# Patient Record
Sex: FEMALE | Race: WHITE | URBAN - METROPOLITAN AREA
[De-identification: names, ages, dates, MRNs, and addresses within clinical notes are randomized per-mention and may not be internally consistent; named-entity substitution may affect disease eponyms.]

---
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
## 2017-08-15 LAB — BLD GP AB SCN SERPL QL: NORMAL

## 2017-08-30 ENCOUNTER — TRANSFERRED RECORDS (OUTPATIENT)
Dept: HEALTH INFORMATION MANAGEMENT | Facility: CLINIC | Age: 22
End: 2017-08-30

## 2017-08-30 LAB
C TRACH DNA SPEC QL PROBE+SIG AMP: NORMAL
N GONORRHOEA DNA SPEC QL PROBE+SIG AMP: NORMAL
SPECIMEN DESCRIP: NORMAL
SPECIMEN DESCRIPTION: NORMAL

## 2017-08-31 LAB
ABO + RH BLD: NORMAL
ABO + RH BLD: NORMAL
HBV SURFACE AG SERPL QL IA: NORMAL
HEMOGLOBIN: 15.1 G/DL (ref 11.7–15.7)
PLATELET # BLD AUTO: 190 10^9/L
RUBELLA ANTIBODY IGG QUANTITATIVE: NORMAL IU/ML
T PALLIDUM IGG SER QL: NON REACTIVE

## 2017-09-15 ENCOUNTER — PRE VISIT (OUTPATIENT)
Dept: MATERNAL FETAL MEDICINE | Facility: CLINIC | Age: 22
End: 2017-09-15

## 2017-10-02 ENCOUNTER — TELEPHONE (OUTPATIENT)
Dept: MATERNAL FETAL MEDICINE | Facility: CLINIC | Age: 22
End: 2017-10-02

## 2017-10-02 NOTE — TELEPHONE ENCOUNTER
[facility] has an appointment scheduled on 9/26 for pt.  Pt was a no show and there was no answer when [facility] tried and called her back that day.    Pt also no showed on 9/20.  It is now outside of the FTS window for pt.   Primary notified. Orders removed.     Zoe CHERRY  , [facility]

## 2017-11-02 ENCOUNTER — PRENATAL OFFICE VISIT (OUTPATIENT)
Dept: OBGYN | Facility: CLINIC | Age: 22
End: 2017-11-02
Payer: COMMERCIAL

## 2017-11-02 VITALS
SYSTOLIC BLOOD PRESSURE: 120 MMHG | WEIGHT: 211.8 LBS | HEIGHT: 64 IN | HEART RATE: 90 BPM | BODY MASS INDEX: 36.16 KG/M2 | DIASTOLIC BLOOD PRESSURE: 66 MMHG

## 2017-11-02 DIAGNOSIS — Z34.82 ENCOUNTER FOR SUPERVISION OF OTHER NORMAL PREGNANCY IN SECOND TRIMESTER: Primary | ICD-10-CM

## 2017-11-02 DIAGNOSIS — Z22.322 MRSA (METHICILLIN RESISTANT STAPH AUREUS) CULTURE POSITIVE: ICD-10-CM

## 2017-11-02 DIAGNOSIS — Z12.4 SCREENING FOR CERVICAL CANCER: ICD-10-CM

## 2017-11-02 PROBLEM — Z34.90 SUPERVISION OF NORMAL PREGNANCY: Status: ACTIVE | Noted: 2017-11-02

## 2017-11-02 PROCEDURE — 99203 OFFICE O/P NEW LOW 30 MIN: CPT | Performed by: ADVANCED PRACTICE MIDWIFE

## 2017-11-02 PROCEDURE — G0145 SCR C/V CYTO,THINLAYER,RESCR: HCPCS | Performed by: ADVANCED PRACTICE MIDWIFE

## 2017-11-02 RX ORDER — PRENATAL VIT/IRON FUM/FOLIC AC 27MG-0.8MG
1 TABLET ORAL DAILY
COMMUNITY
End: 2018-05-17

## 2017-11-02 NOTE — MR AVS SNAPSHOT
After Visit Summary   11/2/2017    Sarthak Barnes    MRN: [id number]           Patient Information     Date Of Birth          1995        Visit Information        Provider Department      11/2/2017 3:00 PM Jeana Whiteside APRN [facility]        Today's Diagnoses     Encounter for supervision of other normal pregnancy in second trimester    -  1    Screening for cervical cancer        MRSA (methicillin resistant staph aureus) culture positive           Follow-ups after your visit        Follow-up notes from your care team     Return in about 4 weeks (around 11/30/2017) for Prenatal Visit.      Your next 10 appointments already scheduled     Nov 13, 2017  2:00 PM CST   US OB > 14 WEEKS COMPLETE SINGLE with OXUS[facility]   [facility]    [address] [address] [phone]           Please bring a list of your medicines (including vitamins, minerals and over-the-counter drugs). Also, tell your doctor about any allergies you may have. Wear comfortable clothes and leave your valuables at home.  If you re less than 20 weeks drink four 8-ounce glasses of fluid an hour before your exam. If you need to empty your bladder before your exam, try to release only a little urine. Then, drink another glass of fluid.  You may have up to two family members in the exam room. If you bring a small child, an adult must be there to care for him or her.  Please call the Imaging Department at your exam site with any questions.            Nov 27, 2017  3:00 PM CST   [facility]    [address]  [facility] [id number] [phone]              Future tests that were ordered for you today     Open Future Orders        Priority Expected Expires Ordered    US OB > 14 Weeks Complete Single  "Routine  2018            Who to contact     If you have questions or need follow up information about today's clinic visit or your schedule please contact [facility] directly at [phone].  Normal or non-critical lab and imaging results will be communicated to you by MyChart, letter or phone within 4 business days after the clinic has received the results. If you do not hear from us within 7 days, please contact the clinic through MyChart or phone. If you have a critical or abnormal lab result, we will notify you by phone as soon as possible.  Submit refill requests through innocutis or call your pharmacy and they will forward the refill request to us. Please allow 3 business days for your refill to be completed.          Additional Information About Your Visit        [facility] Information     innocutis lets you send messages to your doctor, view your test results, renew your prescriptions, schedule appointments and more. To sign up, go to [email or web address] . Click on \"Log in\" on the left side of the screen, which will take you to the Welcome page. Then click on \"Sign up Now\" on the right side of the page.     You will be asked to enter the access code listed below, as well as some personal information. Please follow the directions to create your username and password.     Your access code is: [email or web address]  Expires: 2018  4:34 PM     Your access code will  in 90 days. If you need help or a new code, please call your [facility] clinic or [phone].        Care EveryWhere ID     This is your Care EveryWhere ID. This could be used by other organizations to access your [facility] medical records  [id number]        Your Vitals Were     Pulse Height Last Period BMI (Body Mass Index)          90 5' 4\" (1.626 m) 06/10/2017 36.36 kg/m2         Blood Pressure from Last 3 Encounters:   17 120/66    Weight from Last 3 Encounters:   17 211 lb 12.8 oz (96.1 kg)            "   We Performed the Following     PAP imaged thin layer screen        Primary Care Provider    Physician No Ref-Primary       NO REF-PRIMARY PHYSICIAN        Equal Access to Services     SONIA JUÁREZ : Hadii aad ku hadkimberlyanisa Cano, danniashwin orantes, trinajoão wisejoseashwin garcia, kaveh wasserman. So [facility] [phone].    ATENCIÓN: Si habla español, tiene a martins disposición servicios gratuitos de asistencia lingüística. Llame al [phone].    We comply with applicable federal civil rights laws and [place] laws. We do not discriminate on the basis of race, color, national origin, age, disability, sex, sexual orientation, or gender identity.            Thank you!     Thank you for choosing [facility]  for your care. Our goal is always to provide you with excellent care. Hearing back from our patients is one way we can continue to improve our services. Please take a few minutes to complete the written survey that you may receive in the mail after your visit with us. Thank you!             Your Updated Medication List - Protect others around you: Learn how to safely use, store and throw away your medicines at www.disposemymeds.org.          This list is accurate as of: 11/2/17  4:34 PM.  Always use your most recent med list.                   Brand Name Dispense Instructions for use Diagnosis    prenatal multivitamin plus iron 27-0.8 MG Tabs per tablet      Take 1 tablet by mouth daily        VITAMIN B6 PO      Take 25 mg by mouth

## 2017-11-02 NOTE — LETTER
November 9, 2017      Sarthak Barnes  [address] [address] [facility] [address]    Dear ,      I am happy to inform you that your recent cervical cancer screening test (PAP smear) was normal.      Preventative screenings such as this help to ensure your health for years to come. You should repeat a pap smear in 3 years, unless otherwise directed.      You will still need to return to the clinic every year for your annual exam and other preventive tests.     Please contact the clinic at [phone] if you have further questions.       Sincerely,      SHERON Ni CNM/aniya

## 2017-11-02 NOTE — PROGRESS NOTES
Sarthak Barnes is a 22 year old single ,  who is a previous CNM patient. She presents for a transfer of care OB Visit. This was not a planned pregnancy.     FOB is Seth who is in good health.  FOB IS actively involved in relationship and this pregnancy. FATHER OF BABY was adopted; does not know any of his PMH.     Sarthak is transferring to us from another clinic because she was not satisfied with her care. She [place] prior baby [place]. She had an uncomplicated pregnancy. She also had an early SAB requiring D&C. She has had all of her prenatal labs drawn and they are normal. First trimester U/S confirmed viability; dates changed to reflect U/S. She reports a history of abnormal pap in her prior pregnancy; no follow up. Her records reflect a history of MRSA; Sarthak did not share this today. She will require two tests of cure. She also reports a history of migraines; not affecting this pregnancy. She has significant back pain in the pregnancy; worse when she lies on her back.     She has not had bleeding since her LMP.    She denies abdominal pain since her LMP.  She has had nausea.  has had vomiting. Both resolved.  Any personal or family history of blood clots? No  History of sickle cell anemia or trait? No         Patient's last menstrual period was 06/10/2017..  Estimated Date of Delivery: 2018 Ultrasound consistent with LMP.    MENSTRUAL HISTORY    frequency: every 28 days  Last PAP:  2016  History of abnormal Pap?  Yes: unsure of diagnosis, no follow up    Health maintenance updated:  yes        Current medications are:    Current Outpatient Prescriptions:      Pyridoxine HCl (VITAMIN B6 PO), Take 25 mg by mouth, Disp: , Rfl:      Prenatal Vit-Fe Fumarate-FA (PRENATAL MULTIVITAMIN PLUS IRON) 27-0.8 MG TABS per tablet, Take 1 tablet by mouth daily, Disp: , Rfl:      INFECTION HISTORY  HIV: No  Hepatitis B: No  Hepatitis C: No  Tuberculosis:  declined testing  Genital Herpes self: no  Herpes partner:  no  Chlamydia:  no  Gonorrhea:  no  HPV: unsure, pap collected today  BV:  No  Syphilis:  No  Chicken Pox:  No, she has been vaccinated       OB HISTORY  Obstetric History       T1      L1     SAB0   TAB0   Ectopic0   Multiple0   Live Births1       # Outcome Date GA Lbr Logan/2nd Weight Sex Delivery Anes PTL Lv   3 Current            2 Term 10/18/14 40w0d   M Vag-Spont   JAVAD   1 SAB      SAB             History of GDM: No,  PTL : No,  History of HTN in pregnancy: No,  Thrombocytopenia: No,  Shoulder dystocia: No,  Vacuum Extraction: No  PPH: No   3rd of 4th degree laceration: No.   Other complications: PROM with last pregnancy at 38 weeks, did not report PROM for 2 days, reports being in labor for three days at the hospital once she did go in.     PERSONAL HISTORY  Exercise Habits:  none   Employment: Unemployed.     Travel plans:  are none planned.   Diet: eats regular meals  Abuse concerns? No  Hgb A1c screen:  BMI > 30: Yes, 1st degree family DM: No, History of GDM: No, PCOS: No, High risk ethnicity: No, early 1hr GCT 89    Social History     Social History     Marital status: Single     Spouse name: N/A     Number of children: N/A     Years of education: N/A     Occupational History     Not on file.     Social History Main Topics     Smoking status: Former Smoker     Quit date: 2014     Smokeless tobacco: Never Used     Alcohol use No     Drug use: No     Sexual activity: Yes     Partners: Male     Other Topics Concern     Not on file     Social History Narrative         She  reports that she quit smoking about 3 years ago. She has never used smokeless tobacco.      STD testing offered?  done at previous clinic, negative  Last PHQ-9 score on record = No flowsheet data found.  Last GAD7 score on record = No flowsheet data found.  Alcohol Score = none    PAST MEDICAL/SURGICAL HISTORY  Past Medical History:   Diagnosis Date     Migraine   "    MRSA (methicillin resistant staph aureus) culture positive     was told at a walk in clinic that she has MRSA and was treated, no reoccurance.      Past Surgical History:   Procedure Laterality Date     D & C  2013       FAMILY HISTORY  Family History   Problem Relation Age of Onset     Schizophrenia Mother          ROS:  12 point review of systems negative other than symptoms noted below.      PHYSICAL EXAM  Vitals: /66 (BP Location: Left arm, Patient Position: Sitting, Cuff Size: Adult Large)  Pulse 90  Ht 5' 4\" (1.626 m)  Wt 211 lb 12.8 oz (96.1 kg)  LMP 06/10/2017  BMI 36.36 kg/m2  BMI= Body mass index is 36.36 kg/(m^2).     GENERAL:  22 year old pleasant pregnant female, alert, cooperative and well groomed.  NECK:  Thyroid without enlargement and nodules.  Lymph nodes not palpable.   LUNGS:  Clear to auscultation.  BREAST:  Symmetrical without lesions or nodes.  Nipples everted.  Areolas symmetric.  No palpable axillary nodes.  HEART:  RRR without murmur.  ABDOMEN: Soft without masses or tenderness.  No scars noted..  GENITALIA: BUS without tenderness or inflammation.  Perineum without lesions.    VAGINA:  Pink, normal rugae and discharge.  CERVIX:  Posterior, smooth, without discharge, and firm/ closed   UTERUS: Anteverted and Midposition, nontender 19 weeks in size.  ADNEXA: Without masses or tenderness  RECTAL:  Normal appearance.  Digital exam deferred.  LOWER EXTREMITIES: No edema. No significant varicosities.    ASSESSMENT/PLAN:    IUP at 18w3d    ICD-10-CM    1. Encounter for supervision of other normal pregnancy in second trimester Z34.82 US OB > 14 Weeks Complete Single   2. Screening for cervical cancer Z12.4 PAP imaged thin layer screen   3. MRSA (methicillin resistant staph aureus) culture positive Z22.322         consult for US for AMA patients: NA  Genetic Testing reviewed and discussed, patient declines further testing. She did have CF testing at her prior clinic " that was negative.     COUNSELING    Instructed on use of triage nurse line and contacting the on call CNM after hours in an emergency.     Symptoms of N&V and fatigue usually start to resolve around 12-16 weeks     Reviewed CNM philosophy, call schedule for labor and delivery, and FSH for delivery    1st OB handout given outlining appointment spacing and CNM information    Reviewed exercise and nutrition    Recommend to gain 15 pounds with her pregnancy.    Discussed OTC medications. OB med list given    Encouraged patient to arrange  if needed    Encouraged patient to take PNV's/DHA    Travel precautions discussed, no air travel after 36 weeks and Zika Virus discussed    Will call patient with lab results when available    Does patient desire a RN home visit from the Pending sale to [facility]?  No     F/U to be addressed next visit:  Review pap and U/S results.    Pap collected today.  Oriented to midwifery care; patient requests midwife care.  Anatomy scan ordered.   Will return to the clinic in 4 weeks for her next routine prenatal check.  Will call to be seen sooner if problems arise.    Jeana SÁNCHEZM

## 2017-11-02 NOTE — NURSING NOTE
"Chief Complaint   Patient presents with     Prenatal Care       Initial /66 (BP Location: Left arm, Patient Position: Sitting, Cuff Size: Adult Large)  Pulse 90  Ht 5' 4\" (1.626 m)  Wt 211 lb 12.8 oz (96.1 kg)  LMP 06/10/2017  BMI 36.36 kg/m2 Estimated body mass index is 36.36 kg/(m^2) as calculated from the following:    Height as of this encounter: 5' 4\" (1.626 m).    Weight as of this encounter: 211 lb 12.8 oz (96.1 kg).  Medication Reconciliation: complete     18w3d    + fetal movement  + vomiting, decreasing in 2nd trimester  -edema  + occ dizziness  - headaches    Tisha Grimaldo, CMA                "

## 2017-11-06 LAB
COPATH REPORT: NORMAL
PAP: NORMAL

## 2017-11-13 ENCOUNTER — RADIANT APPOINTMENT (OUTPATIENT)
Dept: ULTRASOUND IMAGING | Facility: CLINIC | Age: 22
End: 2017-11-13
Attending: ADVANCED PRACTICE MIDWIFE
Payer: COMMERCIAL

## 2017-11-13 DIAGNOSIS — Z34.82 ENCOUNTER FOR SUPERVISION OF OTHER NORMAL PREGNANCY IN SECOND TRIMESTER: ICD-10-CM

## 2017-11-13 PROCEDURE — 76805 OB US >/= 14 WKS SNGL FETUS: CPT | Performed by: OBSTETRICS & GYNECOLOGY

## 2017-11-14 ENCOUNTER — TELEPHONE (OUTPATIENT)
Dept: OBGYN | Facility: CLINIC | Age: 22
End: 2017-11-14

## 2017-11-14 DIAGNOSIS — Z34.80 SUPERVISION OF OTHER NORMAL PREGNANCY, ANTEPARTUM: Primary | ICD-10-CM

## 2017-11-14 NOTE — TELEPHONE ENCOUNTER
Attempted to call patient to review anatomy scan. Left message to call clinic. Anatomy scan normal except for non-visualization of some angles of the fetal heart. Recommend repeat U/S in 2-3 weeks. Jeana Whiteside CNM

## 2017-11-17 NOTE — TELEPHONE ENCOUNTER
Called number listed under pts contacts, and name on the vm was josue. Left a generic message stating that I was trying to reach [facility].   JH Null RN

## 2017-11-22 NOTE — TELEPHONE ENCOUNTER
Future order placed.  Note added to appt next week to verify phone #'s and schedule f/u US.  Tamika Clark RN

## 2017-11-24 ENCOUNTER — RADIANT APPOINTMENT (OUTPATIENT)
Dept: ULTRASOUND IMAGING | Facility: CLINIC | Age: 22
End: 2017-11-24
Attending: ADVANCED PRACTICE MIDWIFE
Payer: COMMERCIAL

## 2017-11-24 DIAGNOSIS — Z34.80 SUPERVISION OF OTHER NORMAL PREGNANCY, ANTEPARTUM: ICD-10-CM

## 2017-11-24 PROCEDURE — 76816 OB US FOLLOW-UP PER FETUS: CPT | Performed by: OBSTETRICS & GYNECOLOGY

## 2017-11-24 NOTE — TELEPHONE ENCOUNTER
Patient called back. Told patient Jeana's message as to why repeat US is needed. Patient agreeable. Transferred to scheduling to make appt.     Blanca Rousseau RN

## 2017-11-27 ENCOUNTER — PRENATAL OFFICE VISIT (OUTPATIENT)
Dept: OBGYN | Facility: CLINIC | Age: 22
End: 2017-11-27
Payer: COMMERCIAL

## 2017-11-27 VITALS
DIASTOLIC BLOOD PRESSURE: 60 MMHG | WEIGHT: 219.3 LBS | TEMPERATURE: 98.1 F | SYSTOLIC BLOOD PRESSURE: 100 MMHG | BODY MASS INDEX: 37.64 KG/M2

## 2017-11-27 DIAGNOSIS — Z34.82 ENCOUNTER FOR SUPERVISION OF OTHER NORMAL PREGNANCY IN SECOND TRIMESTER: Primary | ICD-10-CM

## 2017-11-27 PROCEDURE — 99212 OFFICE O/P EST SF 10 MIN: CPT | Performed by: ADVANCED PRACTICE MIDWIFE

## 2017-11-27 NOTE — MR AVS SNAPSHOT
"              After Visit Summary   2017    Sarthak Barnes    MRN: [id number]           Patient Information     Date Of Birth          1995        Visit Information        Provider Department      2017 3:00 PM Jeana Whiteside APRN [facility]        Today's Diagnoses     Encounter for supervision of other normal pregnancy in second trimester    -  1       Follow-ups after your visit        Follow-up notes from your care team     Return in about 4 weeks (around 2017) for Prenatal Visit.      Who to contact     If you have questions or need follow up information about today's clinic visit or your schedule please contact [facility] directly at [phone].  Normal or non-critical lab and imaging results will be communicated to you by MyChart, letter or phone within 4 business days after the clinic has received the results. If you do not hear from us within 7 days, please contact the clinic through [facility] or phone. If you have a critical or abnormal lab result, we will notify you by phone as soon as possible.  Submit refill requests through GoChime or call your pharmacy and they will forward the refill request to us. Please allow 3 business days for your refill to be completed.          Additional Information About Your Visit        MyChart Information     GoChime lets you send messages to your doctor, view your test results, renew your prescriptions, schedule appointments and more. To sign up, go to [email or web address] . Click on \"Log in\" on the left side of the screen, which will take you to the Welcome page. Then click on \"Sign up Now\" on the right side of the page.     You will be asked to enter the access code listed below, as well as some personal information. Please follow the directions to create your username and password.     Your access code is: [email or web address]  Expires: 2018  3:34 PM     Your access code will  in 90 days. " If you need help or a new code, please call your [facility] clinic or [phone].        Care EveryWhere ID     This is your Care EveryWhere ID. This could be used by other organizations to access your [facility] medical records  [id number]        Your Vitals Were     Temperature Last Period BMI (Body Mass Index)             98.1  F (36.7  C) (Oral) 06/10/2017 37.64 kg/m2          Blood Pressure from Last 3 Encounters:   11/27/17 100/60   11/02/17 120/66    Weight from Last 3 Encounters:   11/27/17 219 lb 4.8 oz (99.5 kg)   11/02/17 211 lb 12.8 oz (96.1 kg)              Today, you had the following     No orders found for display       Primary Care Provider Office Phone # Fax #    [facility] [phone]       [address] [place] [address]        Equal Access to Services     SOINA JUÁREZ : Hadii aad ku hadasho Soomaali, waaxda luqadaha, qaybta kaalmada adeegyada, waxay raynein haydelonten светлана lema . So [facility] [phone].    ATENCIÓN: Si habla español, tiene a martins disposición servicios gratuitos de asistencia lingüística. Leighakathryn al [phone].    We comply with applicable federal civil rights laws and [place] laws. We do not discriminate on the basis of race, color, national origin, age, disability, sex, sexual orientation, or gender identity.            Thank you!     Thank you for choosing [facility]  for your care. Our goal is always to provide you with excellent care. Hearing back from our patients is one way we can continue to improve our services. Please take a few minutes to complete the written survey that you may receive in the mail after your visit with us. Thank you!             Your Updated Medication List - Protect others around you: Learn how to safely use, store and throw away your medicines at www.disposemymeds.org.          This list is accurate as of: 11/27/17  3:31 PM.  Always use your most recent med list.                   Brand  Name Dispense Instructions for use Diagnosis    prenatal multivitamin plus iron 27-0.8 MG Tabs per tablet      Take 1 tablet by mouth daily        VITAMIN B6 PO      Take 25 mg by mouth

## 2017-11-27 NOTE — NURSING NOTE
"Chief Complaint   Patient presents with     Prenatal Care     Discuss F/U U/S       Initial /60 (BP Location: Left arm, Patient Position: Chair, Cuff Size: Adult Large)  Temp 98.1  F (36.7  C) (Oral)  Wt 219 lb 4.8 oz (99.5 kg)  LMP 06/10/2017  BMI 37.64 kg/m2 Estimated body mass index is 37.64 kg/(m^2) as calculated from the following:    Height as of 11/2/17: 5' 4\" (1.626 m).    Weight as of this encounter: 219 lb 4.8 oz (99.5 kg).  Medication Reconciliation: complete  22w0d    "

## 2017-11-27 NOTE — PROGRESS NOTES
S: Feels well,  Has started feeling fetal movement.  Denies uterine cramping, vaginal bleeding or leaking of fluid. Patient had repeat anatomy scan for suboptimal heart views; follow up WNL.   O: Vitals: /60 (BP Location: Left arm, Patient Position: Chair, Cuff Size: Adult Large)  Temp 98.1  F (36.7  C) (Oral)  Wt 219 lb 4.8 oz (99.5 kg)  LMP 06/10/2017  BMI 37.64 kg/m2  BMI= Body mass index is 37.64 kg/(m^2).  Exam:  Constitutional: healthy, alert and no distress  Respiratory: respirations even and unlabored  Gastrointestinal: Abdomen soft, non-tender. Fundus measures appropriate for gestational age. Fetal heart tones hear without difficulty and within normal limits  : Deferred  Psychiatric: mentation appears normal and affect normal/bright  A:    Diagnosis Comments   1. Encounter for supervision of other normal pregnancy in second trimester       P: Discussed 20 week fetal screen.   Encouraged increase in oral hydration.  Encouraged patient to call with any questions or concerns.    Jeana Whiteside CNM

## 2017-12-22 ENCOUNTER — PRENATAL OFFICE VISIT (OUTPATIENT)
Dept: OBGYN | Facility: CLINIC | Age: 22
End: 2017-12-22
Payer: MEDICAID

## 2017-12-22 VITALS
DIASTOLIC BLOOD PRESSURE: 60 MMHG | SYSTOLIC BLOOD PRESSURE: 122 MMHG | BODY MASS INDEX: 38.46 KG/M2 | HEIGHT: 64 IN | HEART RATE: 104 BPM | WEIGHT: 225.3 LBS

## 2017-12-22 DIAGNOSIS — Z34.82 ENCOUNTER FOR SUPERVISION OF OTHER NORMAL PREGNANCY IN SECOND TRIMESTER: Primary | ICD-10-CM

## 2017-12-22 PROCEDURE — 87086 URINE CULTURE/COLONY COUNT: CPT | Performed by: ADVANCED PRACTICE MIDWIFE

## 2017-12-22 PROCEDURE — 99207 ZZC PRENATAL VISIT: CPT | Performed by: ADVANCED PRACTICE MIDWIFE

## 2017-12-22 NOTE — PROGRESS NOTES
"S: Feels well,  Baby active.  Denies uterine cramping, vaginal bleeding or leaking of fluid. Patient has a head cold, got sick from her son. She also reports low back pain. Patient went to walk in clinic for UTI symptoms, no UTI diagnosed but still has symptoms.   O: Vitals: /60  Pulse 104  Ht 5' 4\" (1.626 m)  Wt 225 lb 4.8 oz (102.2 kg)  LMP 06/10/2017  BMI 38.67 kg/m2  BMI= Body mass index is 38.67 kg/(m^2).  Exam:  Constitutional: healthy, alert and no distress  Respiratory: respirations even and unlabored  Gastrointestinal: Abdomen soft, non-tender. Fundus measures appropriate for gestational age. Fetal heart tones hear without difficulty and within normal limits  : Deferred  Psychiatric: mentation appears normal and affect normal/bright  A:    Diagnosis Comments   1. Encounter for supervision of other normal pregnancy in second trimester  Urine Culture Aerobic Bacterial      P: Discussed GCT/repeat RPR for next visit, handout provided, reminded of longer appointment.  Tdap next visit; reviewed CDC recommendations and partner/family vaccination recommended as well.  Need for Rhogam? No  Urine culture.  Discussed using a belly band for back pain.  Discussed comfort measures for head cold.   Encouraged patient to call with any questions or concerns.  Return to clinic 4 weeks    Jeana Whiteside CNM      "

## 2017-12-22 NOTE — NURSING NOTE
"Chief Complaint   Patient presents with     Prenatal Care     Seen in  last week for possible UTI, positive or protein and bilirubin, symptoms still persist.    Initial /60  Pulse 104  Ht 5' 4\" (1.626 m)  Wt 225 lb 4.8 oz (102.2 kg)  LMP 06/10/2017  BMI 38.67 kg/m2 Estimated body mass index is 38.67 kg/(m^2) as calculated from the following:    Height as of this encounter: 5' 4\" (1.626 m).    Weight as of this encounter: 225 lb 4.8 oz (102.2 kg).  Medication Reconciliation: complete     + fetal movement  + nausea  - headache  - edema    Tisha Grimaldo, CORIE    "

## 2017-12-22 NOTE — MR AVS SNAPSHOT
"              After Visit Summary   2017    Sarthak Barnes    MRN: [id number]           Patient Information     Date Of Birth          1995        Visit Information        Provider Department      2017 3:15 PM Jeana Whiteside APRN CNM [facility]        Today's Diagnoses     Encounter for supervision of other normal pregnancy in second trimester    -  1       Follow-ups after your visit        Follow-up notes from your care team     Return in about 4 weeks (around 2018) for Prenatal Visit.      Who to contact     If you have questions or need follow up information about today's clinic visit or your schedule please contact [facility] directly at [phone].  Normal or non-critical lab and imaging results will be communicated to you by MyChart, letter or phone within 4 business days after the clinic has received the results. If you do not hear from us within 7 days, please contact the clinic through [employer] or phone. If you have a critical or abnormal lab result, we will notify you by phone as soon as possible.  Submit refill requests through [employer] or call your pharmacy and they will forward the refill request to us. Please allow 3 business days for your refill to be completed.          Additional Information About Your Visit        MyChart Information     [employer] lets you send messages to your doctor, view your test results, renew your prescriptions, schedule appointments and more. To sign up, go to [email or web address] . Click on \"Log in\" on the left side of the screen, which will take you to the Welcome page. Then click on \"Sign up Now\" on the right side of the page.     You will be asked to enter the access code listed below, as well as some personal information. Please follow the directions to create your username and password.     Your access code is: [email or web address]  Expires: 2018  3:34 PM     Your access code will  in 90 days. " "If you need help or a new code, please call your [place] clinic or [phone].        Care EveryWhere ID     This is your Care EveryWhere ID. This could be used by other organizations to access your [place] medical records  [id number]        Your Vitals Were     Pulse Height Last Period BMI (Body Mass Index)          104 5' 4\" (1.626 m) 06/10/2017 38.67 kg/m2         Blood Pressure from Last 3 Encounters:   12/22/17 122/60   11/27/17 100/60   11/02/17 120/66    Weight from Last 3 Encounters:   12/22/17 225 lb 4.8 oz (102.2 kg)   11/27/17 219 lb 4.8 oz (99.5 kg)   11/02/17 211 lb 12.8 oz (96.1 kg)              We Performed the Following     Urine Culture Aerobic Bacterial        Primary Care Provider Office Phone # Fax #    [facility] [phone]       [address] [place] [address]        Equal Access to Services     SONIA JUÁREZ AH: Hadii aad ku hadasho Soomaali, waaxda luqadaha, qaybta kaalmada adeegyada, waxay idiin haydelonten светлана lema . So [facility] [phone].    ATENCIÓN: Si habla español, tiene a martins disposición servicios gratuitos de asistencia lingüística. Llame al [phone].    We comply with applicable federal civil rights laws and [place] laws. We do not discriminate on the basis of race, color, national origin, age, disability, sex, sexual orientation, or gender identity.            Thank you!     Thank you for choosing [facility]  for your care. Our goal is always to provide you with excellent care. Hearing back from our patients is one way we can continue to improve our services. Please take a few minutes to complete the written survey that you may receive in the mail after your visit with us. Thank you!             Your Updated Medication List - Protect others around you: Learn how to safely use, store and throw away your medicines at www.disposemymeds.org.          This list is accurate as of: 12/22/17  3:42 PM.  Always use " your most recent med list.                   Brand Name Dispense Instructions for use Diagnosis    prenatal multivitamin plus iron 27-0.8 MG Tabs per tablet      Take 1 tablet by mouth daily        VITAMIN B6 PO      Take 25 mg by mouth

## 2017-12-23 LAB
BACTERIA SPEC CULT: NORMAL
SPECIMEN SOURCE: NORMAL

## 2018-01-15 ENCOUNTER — PRENATAL OFFICE VISIT (OUTPATIENT)
Dept: OBGYN | Facility: CLINIC | Age: 23
End: 2018-01-15
Payer: MEDICAID

## 2018-01-15 VITALS — WEIGHT: 229.3 LBS | DIASTOLIC BLOOD PRESSURE: 70 MMHG | SYSTOLIC BLOOD PRESSURE: 122 MMHG | BODY MASS INDEX: 39.36 KG/M2

## 2018-01-15 DIAGNOSIS — Z34.81 ENCOUNTER FOR SUPERVISION OF OTHER NORMAL PREGNANCY IN FIRST TRIMESTER: Primary | ICD-10-CM

## 2018-01-15 LAB
ERYTHROCYTE [DISTWIDTH] IN BLOOD BY AUTOMATED COUNT: 13 % (ref 10–15)
HCT VFR BLD AUTO: 35.3 % (ref 35–47)
HGB BLD-MCNC: 11.8 G/DL (ref 11.7–15.7)
MCH RBC QN AUTO: 29.4 PG (ref 26.5–33)
MCHC RBC AUTO-ENTMCNC: 33.4 G/DL (ref 31.5–36.5)
MCV RBC AUTO: 88 FL (ref 78–100)
PLATELET # BLD AUTO: 154 10E9/L (ref 150–450)
RBC # BLD AUTO: 4.01 10E12/L (ref 3.8–5.2)
WBC # BLD AUTO: 8.3 10E9/L (ref 4–11)

## 2018-01-15 PROCEDURE — 36415 COLL VENOUS BLD VENIPUNCTURE: CPT | Performed by: ADVANCED PRACTICE MIDWIFE

## 2018-01-15 PROCEDURE — 85027 COMPLETE CBC AUTOMATED: CPT | Performed by: ADVANCED PRACTICE MIDWIFE

## 2018-01-15 PROCEDURE — 99207 ZZC PRENATAL VISIT: CPT | Performed by: ADVANCED PRACTICE MIDWIFE

## 2018-01-15 PROCEDURE — 86780 TREPONEMA PALLIDUM: CPT | Performed by: ADVANCED PRACTICE MIDWIFE

## 2018-01-15 PROCEDURE — 90715 TDAP VACCINE 7 YRS/> IM: CPT | Performed by: ADVANCED PRACTICE MIDWIFE

## 2018-01-15 PROCEDURE — 90471 IMMUNIZATION ADMIN: CPT | Performed by: ADVANCED PRACTICE MIDWIFE

## 2018-01-15 PROCEDURE — 82950 GLUCOSE TEST: CPT | Performed by: ADVANCED PRACTICE MIDWIFE

## 2018-01-15 NOTE — MR AVS SNAPSHOT
"              After Visit Summary   1/15/2018    Sarthak Barnes    MRN: [id number]           Patient Information     Date Of Birth          1995        Visit Information        Provider Department      1/15/2018 3:15 PM Jeana Whiteside APRN CNM [facility]        Today's Diagnoses     Encounter for supervision of other normal pregnancy in first trimester    -  1       Follow-ups after your visit        Follow-up notes from your care team     Return in about 2 weeks (around 2018) for Prenatal Visit.      Who to contact     If you have questions or need follow up information about today's clinic visit or your schedule please contact [facility] directly at [phone].  Normal or non-critical lab and imaging results will be communicated to you by MyChart, letter or phone within 4 business days after the clinic has received the results. If you do not hear from us within 7 days, please contact the clinic through [employer] or phone. If you have a critical or abnormal lab result, we will notify you by phone as soon as possible.  Submit refill requests through [employer] or call your pharmacy and they will forward the refill request to us. Please allow 3 business days for your refill to be completed.          Additional Information About Your Visit        MyChart Information     [employer] lets you send messages to your doctor, view your test results, renew your prescriptions, schedule appointments and more. To sign up, go to [email or web address] . Click on \"Log in\" on the left side of the screen, which will take you to the Welcome page. Then click on \"Sign up Now\" on the right side of the page.     You will be asked to enter the access code listed below, as well as some personal information. Please follow the directions to create your username and password.     Your access code is: [email or web address]  Expires: 2018  3:34 PM     Your access code will  in 90 days. If " you need help or a new code, please call your [place] clinic or [phone].        Care EveryWhere ID     This is your Care EveryWhere ID. This could be used by other organizations to access your [place] medical records  [id number]        Your Vitals Were     Last Period BMI (Body Mass Index)                06/10/2017 39.36 kg/m2           Blood Pressure from Last 3 Encounters:   01/15/18 122/70   12/22/17 122/60   11/27/17 100/60    Weight from Last 3 Encounters:   01/15/18 229 lb 4.8 oz (104 kg)   12/22/17 225 lb 4.8 oz (102.2 kg)   11/27/17 219 lb 4.8 oz (99.5 kg)              We Performed the Following     Anti Treponema     CBC with platelets     Glucose tolerance gest screen 1 hour        Primary Care Provider Office Phone # Fax #    [facility] [phone]       [address] [place] [address]        Equal Access to Services     SONIA JUÁREZ : Hadii aad ku hadasho Soomaali, waaxda luqadaha, qaybta kaalmada adeegyada, waxay idiin hayaan cadyeg vitaarasayra lema . So [facility] [phone].    ATENCIÓN: Si habla español, tiene a martins disposición servicios gratuitos de asistencia lingüística. Llame al [phone].    We comply with applicable federal civil rights laws and [place] laws. We do not discriminate on the basis of race, color, national origin, age, disability, sex, sexual orientation, or gender identity.            Thank you!     Thank you for choosing [facility]  for your care. Our goal is always to provide you with excellent care. Hearing back from our patients is one way we can continue to improve our services. Please take a few minutes to complete the written survey that you may receive in the mail after your visit with us. Thank you!             Your Updated Medication List - Protect others around you: Learn how to safely use, store and throw away your medicines at www.disposemymeds.org.          This list is accurate as of: 1/15/18  3:58  PM.  Always use your most recent med list.                   Brand Name Dispense Instructions for use Diagnosis    prenatal multivitamin plus iron 27-0.8 MG Tabs per tablet      Take 1 tablet by mouth daily        VITAMIN B6 PO      Take 25 mg by mouth

## 2018-01-15 NOTE — PROGRESS NOTES
S: Feels well,  Baby active.  Denies uterine cramping, vaginal bleeding or leaking of fluid. Discussed comfort measures for hemorrhoids.   O: Vitals: /70 (BP Location: Left arm, Patient Position: Chair, Cuff Size: Adult Regular)  Wt 229 lb 4.8 oz (104 kg)  LMP 06/10/2017  BMI 39.36 kg/m2  BMI= Body mass index is 39.36 kg/(m^2).  Exam:  Constitutional: healthy, alert and no distress  Respiratory: respirations even and unlabored  Gastrointestinal: Abdomen soft, non-tender. Fundus measures appropriate for gestational age. Fetal heart tones hear without difficulty and within normal limits  : Deferred  Psychiatric: mentation appears normal and affect normal/bright  A:     ICD-10-CM    1. Encounter for supervision of other normal pregnancy in first trimester Z34.81 CBC with platelets     Anti Treponema     Glucose tolerance gest screen 1 hour     P: GCT/repeat RPR today, handout provided.  Tdap given; reviewed CDC recommendations and partner/family vaccination recommended as well.  Need for Rhogam? B+.   Encouraged patient to call with any questions or concerns.  Return to clinic 2 weeks    Jeana Whiteside CNM

## 2018-01-15 NOTE — NURSING NOTE
"Chief Complaint   Patient presents with     Prenatal Care       Initial /70 (BP Location: Left arm, Patient Position: Chair, Cuff Size: Adult Regular)  Wt 229 lb 4.8 oz (104 kg)  LMP 06/10/2017  BMI 39.36 kg/m2 Estimated body mass index is 39.36 kg/(m^2) as calculated from the following:    Height as of 12/22/17: 5' 4\" (1.626 m).    Weight as of this encounter: 229 lb 4.8 oz (104 kg).  Medication Reconciliation: complete  29w0d    "

## 2018-01-16 LAB
GLUCOSE 1H P 50 G GLC PO SERPL-MCNC: 100 MG/DL (ref 60–129)
T PALLIDUM IGG+IGM SER QL: NEGATIVE

## 2018-02-07 ENCOUNTER — TELEPHONE (OUTPATIENT)
Dept: OBGYN | Facility: CLINIC | Age: 23
End: 2018-02-07

## 2018-02-07 NOTE — TELEPHONE ENCOUNTER
I called patient to get her rescheduled from a miss appointment with Graciela Perkins CNM on 02/07/2018. She states that with her schedule she is unable to come in sooner than the 22nd of February. She was made an appointment with Magi Nolasco CNM at 1:00 on 2/22/2018.    Espinoza Perkins CMA

## 2018-02-09 ENCOUNTER — TELEPHONE (OUTPATIENT)
Dept: OBGYN | Facility: CLINIC | Age: 23
End: 2018-02-09

## 2018-02-09 ENCOUNTER — HOSPITAL ENCOUNTER (OUTPATIENT)
Facility: CLINIC | Age: 23
Discharge: HOME | End: 2018-02-09
Attending: ADVANCED PRACTICE MIDWIFE | Admitting: ADVANCED PRACTICE MIDWIFE
Payer: MEDICAID

## 2018-02-09 VITALS
SYSTOLIC BLOOD PRESSURE: 125 MMHG | HEART RATE: 96 BPM | RESPIRATION RATE: 18 BRPM | TEMPERATURE: 98 F | DIASTOLIC BLOOD PRESSURE: 80 MMHG

## 2018-02-09 PROBLEM — Z36.89 ENCOUNTER FOR TRIAGE IN PREGNANT PATIENT: Status: ACTIVE | Noted: 2018-02-09

## 2018-02-09 LAB
A1 MICROGLOB PLACENTAL VAG QL: NEGATIVE
ALBUMIN UR-MCNC: 30 MG/DL
APPEARANCE UR: ABNORMAL
BILIRUB UR QL STRIP: NEGATIVE
COLOR UR AUTO: YELLOW
FIBRONECTIN FETAL VAG QL: NEGATIVE
GLUCOSE UR STRIP-MCNC: NEGATIVE MG/DL
HGB UR QL STRIP: NEGATIVE
HYALINE CASTS #/AREA URNS LPF: 1 /LPF (ref 0–2)
KETONES UR STRIP-MCNC: 5 MG/DL
LEUKOCYTE ESTERASE UR QL STRIP: NEGATIVE
MRSA DNA SPEC QL NAA+PROBE: NEGATIVE
MUCOUS THREADS #/AREA URNS LPF: PRESENT /LPF
NITRATE UR QL: NEGATIVE
PH UR STRIP: 9 PH (ref 5–7)
RBC #/AREA URNS AUTO: <1 /HPF (ref 0–2)
SOURCE: ABNORMAL
SP GR UR STRIP: 1.02 (ref 1–1.03)
SPECIMEN SOURCE: ABNORMAL
SPECIMEN SOURCE: NORMAL
SQUAMOUS #/AREA URNS AUTO: 2 /HPF (ref 0–1)
UROBILINOGEN UR STRIP-MCNC: 0 MG/DL (ref 0–2)
WBC #/AREA URNS AUTO: <1 /HPF (ref 0–2)
WET PREP SPEC: ABNORMAL

## 2018-02-09 PROCEDURE — 25000128 H RX IP 250 OP 636: Performed by: ADVANCED PRACTICE MIDWIFE

## 2018-02-09 PROCEDURE — 84112 EVAL AMNIOTIC FLUID PROTEIN: CPT | Performed by: ADVANCED PRACTICE MIDWIFE

## 2018-02-09 PROCEDURE — 96374 THER/PROPH/DIAG INJ IV PUSH: CPT

## 2018-02-09 PROCEDURE — 87210 SMEAR WET MOUNT SALINE/INK: CPT | Performed by: ADVANCED PRACTICE MIDWIFE

## 2018-02-09 PROCEDURE — 87086 URINE CULTURE/COLONY COUNT: CPT | Performed by: ADVANCED PRACTICE MIDWIFE

## 2018-02-09 PROCEDURE — 40000809 ZZH STATISTIC NO DOCUMENTATION TO SUPPORT CHARGE

## 2018-02-09 PROCEDURE — 96360 HYDRATION IV INFUSION INIT: CPT

## 2018-02-09 PROCEDURE — 59025 FETAL NON-STRESS TEST: CPT

## 2018-02-09 PROCEDURE — G0463 HOSPITAL OUTPT CLINIC VISIT: HCPCS | Mod: 25

## 2018-02-09 PROCEDURE — 81001 URINALYSIS AUTO W/SCOPE: CPT | Performed by: ADVANCED PRACTICE MIDWIFE

## 2018-02-09 PROCEDURE — 96361 HYDRATE IV INFUSION ADD-ON: CPT

## 2018-02-09 PROCEDURE — 59025 FETAL NON-STRESS TEST: CPT | Mod: 26 | Performed by: OBSTETRICS & GYNECOLOGY

## 2018-02-09 PROCEDURE — 82731 ASSAY OF FETAL FIBRONECTIN: CPT | Performed by: ADVANCED PRACTICE MIDWIFE

## 2018-02-09 PROCEDURE — 87641 MR-STAPH DNA AMP PROBE: CPT | Performed by: ADVANCED PRACTICE MIDWIFE

## 2018-02-09 PROCEDURE — 87640 STAPH A DNA AMP PROBE: CPT | Performed by: ADVANCED PRACTICE MIDWIFE

## 2018-02-09 RX ORDER — SODIUM CHLORIDE, SODIUM LACTATE, POTASSIUM CHLORIDE, CALCIUM CHLORIDE 600; 310; 30; 20 MG/100ML; MG/100ML; MG/100ML; MG/100ML
INJECTION, SOLUTION INTRAVENOUS CONTINUOUS
Status: DISCONTINUED | OUTPATIENT
Start: 2018-02-09 | End: 2018-02-09 | Stop reason: HOSPADM

## 2018-02-09 RX ORDER — ONDANSETRON 2 MG/ML
4 INJECTION INTRAMUSCULAR; INTRAVENOUS EVERY 6 HOURS PRN
Status: DISCONTINUED | OUTPATIENT
Start: 2018-02-09 | End: 2018-02-09 | Stop reason: HOSPADM

## 2018-02-09 RX ADMIN — SODIUM CHLORIDE, POTASSIUM CHLORIDE, SODIUM LACTATE AND CALCIUM CHLORIDE 500 ML: 600; 310; 30; 20 INJECTION, SOLUTION INTRAVENOUS at 11:08

## 2018-02-09 RX ADMIN — ONDANSETRON 4 MG: 2 INJECTION INTRAMUSCULAR; INTRAVENOUS at 11:08

## 2018-02-09 NOTE — PROGRESS NOTES
MATERNAL ASSESSMENT CENTER CNM TRIAGE NOTE    Sarthak Barnes is a 23 year old  with and IUP at 32w4d who presents with complaint of contractions and possible ROM.    Patient states baby is active.  Denies vaginal bleeding  Present OB History at [facility] with the CNMs.     Problems this pregnancy:   1. None    ROS:  Patient is alert and oriented      PHYSICAL EXAM:  /80  Pulse 96  Temp 98  F (36.7  C)  Resp 18  LMP 06/10/2017    FHT's 140s with moderate variability  Accelerations: present   Decelerations:  absent        Contractions: Pt is not tobi     Abdomen: gravid, and nontender  Bloody show: no  Cervix: closed/ 0/ Posterior/ firm/ FLOATING  Membranes are intact       ASSESSMENT :   23 year old  with ferrari IUP 32w4d not in labor  NST  reactive  GBS unknown and membranes intact         PLAN:  Discharge home  Continue routine prenatal care      Teaching done r/t to s/s of labor, SROM, decreased fetal movement, comfort measures in third trimester.  Instructed to please refer to the discharge handouts, the RN triage line or on-call CNM for any questions or concerns.  Pt verbalizes understanding and agreement with current plan of care.    GARY FINN CNM

## 2018-02-09 NOTE — PLAN OF CARE
Data: Patient assessed in the [facility] for uterine contractions, leaking vaginal fluid, abdominal pain, pelvic pain, nausea and vomiting.  Cervical exam closed.  Membranes intact.   Action:  Presumed adequate fetal oxygenation documented (see flow record). Discharge instructions reviewed.  Patient instructed to report change in fetal movement, vaginal leaking of fluid or bleeding, abdominal pain, or any concerns related to the pregnancy to her nurse/physician.    Response: Orders to discharge home per Graciela Perkins.  Patient verbalized understanding of education and verbalized agreement with plan. Discharged to home at 1300 with follow up next week.

## 2018-02-09 NOTE — IP AVS SNAPSHOT
MRN:[id number]                      After Visit Summary   2/9/2018    Sarthak Barnes    MRN: [id number]           Thank you!     Thank you for choosing [facility] for your care. Our goal is always to provide you with excellent care. Hearing back from our patients is one way we can continue to improve our services. Please take a few minutes to complete the written survey that you may receive in the mail after you visit. If you would like to speak to someone directly about your visit please contact Patient Relations at [phone]. Thank you!          Patient Information     Date Of Birth          1995        About your hospital stay     You were admitted on:  February 9, 2018 You last received care in the:  [facility] Labor and Delivery    You were discharged on:  February 9, 2018       Who to Call     For medical emergencies, please call 911.  For non-urgent questions about your medical care, please call your primary care provider or clinic, [phone]          Attending Provider     Provider Graciela Nick CNM OB/Gyn       Primary Care Provider Office Phone # Fax #    [facility] [phone]      Your next 10 appointments already scheduled     Feb 22, 2018  1:00 PM CST   ESTABLISHED PRENATAL with SHERON Harrington CNM   [facility] ([facility])    [address]  [facility] [address] [phone]              Further instructions from your care team       Discharge Instruction for Undelivered Patients      You were seen for: Labor Assessment  We Consulted: Graciela Perkins CNM  You had (Test or Medicine):IV fluids, monitoring, labs tests     Diet:   Drink 8 to 12 glasses of liquids (milk, juice, water) every day.  You may eat meals and snacks.     Activity:  Call your doctor or nurse midwife if your baby is moving less than usual.     Call your provider if you  "notice:  Swelling in your face or increased swelling in your hands or legs.  Headaches that are not relieved by Tylenol (acetaminophen).  Changes in your vision (blurring: seeing spots or stars.)  Nausea (sick to your stomach) and vomiting (throwing up).   Weight gain of 5 pounds or more per week.  Heartburn that doesn't go away.  Signs of bladder infection: pain when you urinate (use the toilet), need to go more often and more urgently.  The bag of gipson (rupture of membranes) breaks, or you notice leaking in your underwear.  Bright red blood in your underwear.  Abdominal (lower belly) or stomach pain.  Second (plus) baby: Contractions (tightening) less than 10 minutes apart and getting stronger.  *If less than 34 weeks: Contractions (tightenings) more than 6 times in one hour.  Increase or change in vaginal discharge (note the color and amount)      Follow-up:  Make an appointment to be seen on next week.          Pending Results     Date and Time Order Name Status Description    2/9/2018 1205 Urine Culture Aerobic Bacterial In process     2/9/2018 1119 Methicillin Resist/Sens S. aureus PCR In process             Admission Information     Date & Time Provider Department Dept. Phone    2/9/2018 Graciela Perkins, [facility] Labor and Delivery [phone]      Your Vitals Were     Blood Pressure Pulse Temperature Respirations Last Period       125/80 96 98  F (36.7  C) 18 06/10/2017       [employer] Information     [employer] lets you send messages to your doctor, view your test results, renew your prescriptions, schedule appointments and more. To sign up, go to [email or web address] . Click on \"Log in\" on the left side of the screen, which will take you to the Welcome page. Then click on \"Sign up Now\" on the right side of the page.     You will be asked to enter the access code listed below, as well as some personal information. Please follow the directions to create your username and password.     Your " access code is: [email or web address]  Expires: 5/10/2018 12:14 PM     Your access code will  in 90 days. If you need help or a new code, please call your [place] clinic or [phone].        Care EveryWhere ID     This is your Care EveryWhere ID. This could be used by other organizations to access your [place] medical records  [id number]        Equal Access to Services     [facility] MARQUITA : Hadii aad ku hadasho Soomaali, waaxda luqadaha, qaybta kaalmada adeegyada, waxay idiin hayaan adeeg khdebbiesh lazarina . So [facility] [phone].    ATENCIÓN: Si habla espmay, tiene a martins disposición servicios gratuitos de asistencia lingüística. Llame al [phone].    We comply with applicable federal civil rights laws and [place] laws. We do not discriminate on the basis of race, color, national origin, age, disability, sex, sexual orientation, or gender identity.               Review of your medicines      UNREVIEWED medicines. Ask your doctor about these medicines        Dose / Directions    prenatal multivitamin plus iron 27-0.8 MG Tabs per tablet        Dose:  1 tablet   Take 1 tablet by mouth daily   Refills:  0                Protect others around you: Learn how to safely use, store and throw away your medicines at www.disposemymeds.org.             Medication List: This is a list of all your medications and when to take them. Check marks below indicate your daily home schedule. Keep this list as a reference.      Medications           Morning Afternoon Evening Bedtime As Needed    prenatal multivitamin plus iron 27-0.8 MG Tabs per tablet   Take 1 tablet by mouth daily

## 2018-02-09 NOTE — PROVIDER NOTIFICATION
02/09/18 1256   Provider Notification   Provider Name/Title Graciela Perkins   Method of Notification Electronic Page;Phone   Request Evaluate - Remote   Notification Reason Lab/Diagnostic Study   Reviewed wet prep results. Order to discharge pt with follow up next week.

## 2018-02-09 NOTE — H&P
MATERNAL ASSESSMENT CENTER CNM TRIAGE NOTE    Sarthak Barnes is a 23 year old  with and IUP at 32w4d who presents with complaint of pelvic cramping, and possible rupture of membranes. Called the triage nurse earlier and reported contractions every 5 minutes and possible rupture of membranes. Reports that she had two episode of trickling fluids yesterday after a bath.    Patient states baby is active.    Denies vaginal bleeding  Present OB History at [facility] with the CNMs.     Problems this pregnancy:   1. none    ROS:  Patient is alert and oriented  Negative for vision changes, headache  Negative for constipation, diarrhea, vomiting.  Positive for suprapubic pain, nausea  Negative for edema, Reflexed WNL and equal bilaterally    PHYSICAL EXAM:  /80  Pulse 96  Temp 98  F (36.7  C)  Resp 18  LMP 06/10/2017    FHT's 130 with moderate variability  Accelerations: present   Decelerations:  absent        Contractions: Pt is not tobi     Abdomen: gravid, and nontender to palpation  Bloody show: no  Amnisure collected  Sterile speculum inserted, cervix appears closed, swab collected for FFN  Cervix: not checked until results of amnisure available      ASSESSMENT :   23 year old  with ferrari IUP 32w4d for observation.  Pelvic pain and rule out ROM  NST  reactive  GBS unknown          PLAN:  Will continue to monitor and assess further when labs available.        GARY FINN CNM

## 2018-02-09 NOTE — IP AVS SNAPSHOT
[facility] Labor and Delivery    [address]    [facility] [address]    Phone:  [phone]    Fax:  [phone]                                       After Visit Summary   2/9/2018    Sarthak Barnes    MRN: [id number]           After Visit Summary Signature Page     I have received my discharge instructions, and my questions have been answered. I have discussed any challenges I see with this plan with the nurse or doctor.    ..........................................................................................................................................  Patient/Patient Representative Signature      ..........................................................................................................................................  Patient Representative Print Name and Relationship to Patient    ..................................................               ................................................  Date                                            Time    ..........................................................................................................................................  Reviewed by Signature/Title    ...................................................              ..............................................  Date                                                            Time

## 2018-02-09 NOTE — DISCHARGE INSTRUCTIONS
Discharge Instruction for Undelivered Patients      You were seen for: Labor Assessment  We Consulted: Graciela Perkins CNM  You had (Test or Medicine):IV fluids, monitoring, labs tests     Diet:   Drink 8 to 12 glasses of liquids (milk, juice, water) every day.  You may eat meals and snacks.     Activity:  Call your doctor or nurse midwife if your baby is moving less than usual.     Call your provider if you notice:  Swelling in your face or increased swelling in your hands or legs.  Headaches that are not relieved by Tylenol (acetaminophen).  Changes in your vision (blurring: seeing spots or stars.)  Nausea (sick to your stomach) and vomiting (throwing up).   Weight gain of 5 pounds or more per week.  Heartburn that doesn't go away.  Signs of bladder infection: pain when you urinate (use the toilet), need to go more often and more urgently.  The bag of gipson (rupture of membranes) breaks, or you notice leaking in your underwear.  Bright red blood in your underwear.  Abdominal (lower belly) or stomach pain.  Second (plus) baby: Contractions (tightening) less than 10 minutes apart and getting stronger.  *If less than 34 weeks: Contractions (tightenings) more than 6 times in one hour.  Increase or change in vaginal discharge (note the color and amount)      Follow-up:  Make an appointment to be seen on next week.

## 2018-02-09 NOTE — PLAN OF CARE
here c/o contractions, nausea/vomiting, and questioning leaking of fluid. No vaginal bleeding.  Pt states contractions/pressure started last night at 2100 then continued to 0200 however states she was able to sleep after that. Woke up and still felt pressure/pain in low abdomen.  Felt leaking following a bath last night. Pt unable to eat or drink since last night due to nausea and vomiting.    Graciela Perkins CNM here at bedside to see and evaluate.

## 2018-02-09 NOTE — TELEPHONE ENCOUNTER
Pt is 32w4d and says she has been having contractions since last night around 8 or 9pm. She thinks they are occurring about every 5 minutes. She rates the pain 6/10, last night they were 10/10. She has been vomiting. She is unsure if she has been leaking, she said some fluid came out last night when she got out of the tub and she has been urinating every time she vomits, but is not sure if it is fluid or urine. She is not having vaginal bleeding. Her baby is moving normally. She has a history of  birth with her previous pregnancy because of  rupture of membranes at either 34 or 36 weeks (patient wasn't sure which). Recommended she be evaluated at the hospital. Text page sent to on call [facility].      Blanca Rousseau RN

## 2018-02-10 LAB
BACTERIA SPEC CULT: NORMAL
Lab: NORMAL
SPECIMEN SOURCE: NORMAL

## 2018-02-22 ENCOUNTER — PRENATAL OFFICE VISIT (OUTPATIENT)
Dept: OBGYN | Facility: CLINIC | Age: 23
End: 2018-02-22
Payer: MEDICAID

## 2018-02-22 VITALS
HEART RATE: 104 BPM | SYSTOLIC BLOOD PRESSURE: 126 MMHG | WEIGHT: 242 LBS | BODY MASS INDEX: 41.54 KG/M2 | DIASTOLIC BLOOD PRESSURE: 60 MMHG

## 2018-02-22 DIAGNOSIS — Z34.03 ENCOUNTER FOR SUPERVISION OF NORMAL FIRST PREGNANCY IN THIRD TRIMESTER: Primary | ICD-10-CM

## 2018-02-22 PROCEDURE — 99207 ZZC PRENATAL VISIT: CPT | Performed by: ADVANCED PRACTICE MIDWIFE

## 2018-02-22 NOTE — MR AVS SNAPSHOT
"              After Visit Summary   2/22/2018    Sarthak Barnes    MRN: [id number]           Patient Information     Date Of Birth          1995        Visit Information        Provider Department      2/22/2018 1:00 PM Magi Nolasco APRN CNM [facility]        Today's Diagnoses     Encounter for supervision of normal first pregnancy in third trimester    -  1       Follow-ups after your visit        Follow-up notes from your care team     Return in about 2 weeks (around 3/8/2018) for Prenatal visit.      Your next 10 appointments already scheduled     Mar 06, 2018  4:00 PM CST   ESTABLISHED PRENATAL with SHERON Harrington CNM   [facility]    [address]  [facility] [address] [phone]              Who to contact     If you have questions or need follow up information about today's clinic visit or your schedule please contact [facility] directly at [phone].  Normal or non-critical lab and imaging results will be communicated to you by Trackyhart, letter or phone within 4 business days after the clinic has received the results. If you do not hear from us within 7 days, please contact the clinic through Trackyhart or phone. If you have a critical or abnormal lab result, we will notify you by phone as soon as possible.  Submit refill requests through Anulex or call your pharmacy and they will forward the refill request to us. Please allow 3 business days for your refill to be completed.          Additional Information About Your Visit        Trackyhart Information     Anulex lets you send messages to your doctor, view your test results, renew your prescriptions, schedule appointments and more. To sign up, go to [email or web address] . Click on \"Log in\" on the left side of the screen, which will take you to the Welcome page. Then click on \"Sign up Now\" on the right side of the page.     You will be " asked to enter the access code listed below, as well as some personal information. Please follow the directions to create your username and password.     Your access code is: [email or web address]  Expires: 5/10/2018 12:14 PM     Your access code will  in 90 days. If you need help or a new code, please call your [place] clinic or [phone].        Care EveryWhere ID     This is your Care EveryWhere ID. This could be used by other organizations to access your [place] medical records  [id number]        Your Vitals Were     Pulse Last Period Breastfeeding? BMI (Body Mass Index)          104 06/10/2017 No 41.54 kg/m2         Blood Pressure from Last 3 Encounters:   18 126/60   18 125/80   01/15/18 122/70    Weight from Last 3 Encounters:   18 242 lb (109.8 kg)   01/15/18 229 lb 4.8 oz (104 kg)   17 225 lb 4.8 oz (102.2 kg)              Today, you had the following     No orders found for display       Primary Care Provider Office Phone # Fax #    [facility] [phone]       [address] [place] [address]        Equal Access to Services     SONIA JUÁREZ AH: Hadii aad ku hadasho Soomaali, waaxda luqadaha, qaybta kaalmada adeegyada, waxay idiin hayaan cadyeg andrew lazarina wasserman. So [facility] [phone].    ATENCIÓN: Si habla español, tiene a martins disposición servicios gratuitos de asistencia lingüística. Llame al [phone].    We comply with applicable federal civil rights laws and [place] laws. We do not discriminate on the basis of race, color, national origin, age, disability, sex, sexual orientation, or gender identity.            Thank you!     Thank you for choosing [facility]  for your care. Our goal is always to provide you with excellent care. Hearing back from our patients is one way we can continue to improve our services. Please take a few minutes to complete the written survey that you may receive in the mail after your visit  with us. Thank you!             Your Updated Medication List - Protect others around you: Learn how to safely use, store and throw away your medicines at www.disposemymeds.org.          This list is accurate as of 2/22/18  5:15 PM.  Always use your most recent med list.                   Brand Name Dispense Instructions for use Diagnosis    prenatal multivitamin plus iron 27-0.8 MG Tabs per tablet      Take 1 tablet by mouth daily

## 2018-02-22 NOTE — PROGRESS NOTES
S: Baby active.  Occasional uterine cramping. Denies vaginal bleeding or leaking of fluid.   O: Vitals: /60 (BP Location: Left arm, Patient Position: Sitting, Cuff Size: Adult Regular)  Pulse 104  Wt 242 lb (109.8 kg)  LMP 06/10/2017  Breastfeeding? No  BMI 41.54 kg/m2  BMI= Body mass index is 41.54 kg/(m^2).  Exam:  Constitutional: healthy, alert and no distress  Respiratory: respirations even and unlabored  Gastrointestinal: Abdomen soft, non-tender. Fundus measures appropriate for gestational age. Fetal heart tones heard without difficulty and within normal limits.  Cephalic per leopold's maneuver.   : Deferred  Psychiatric: mentation appears normal and affect normal/bright  A: (Z34.03) Encounter for supervision of normal first pregnancy in third trimester  (primary encounter diagnosis)  Comment: wnl  Plan: return to clinic 2 weeks       P: Discussed GBS screen for next visit. Discussed plans for labor.   Plans to bottle feed. Reviewed benefits of colostrum.   Encouraged patient to call with any questions or concerns.  Return to clinic 2 weeks    SHERON Worthy, CNM

## 2018-02-22 NOTE — NURSING NOTE
"Chief Complaint   Patient presents with     Prenatal Care     34 weeks 3 days- no concerns        Initial /60 (BP Location: Left arm, Patient Position: Sitting, Cuff Size: Adult Regular)  Pulse 104  Wt 242 lb (109.8 kg)  LMP 06/10/2017  Breastfeeding? No  BMI 41.54 kg/m2 Estimated body mass index is 41.54 kg/(m^2) as calculated from the following:    Height as of 12/22/17: 5' 4\" (1.626 m).    Weight as of this encounter: 242 lb (109.8 kg).  Medication Reconciliation: complete     +FM  -Edema  -Contractions  +Cramping    34w3d  Espinoza Perkins, CORIE       "

## 2018-03-06 ENCOUNTER — PRENATAL OFFICE VISIT (OUTPATIENT)
Dept: OBGYN | Facility: CLINIC | Age: 23
End: 2018-03-06
Payer: MEDICAID

## 2018-03-06 VITALS — BODY MASS INDEX: 41.88 KG/M2 | DIASTOLIC BLOOD PRESSURE: 60 MMHG | SYSTOLIC BLOOD PRESSURE: 120 MMHG | WEIGHT: 244 LBS

## 2018-03-06 DIAGNOSIS — Z22.322 MRSA CARRIER: ICD-10-CM

## 2018-03-06 DIAGNOSIS — Z34.03 ENCOUNTER FOR SUPERVISION OF NORMAL FIRST PREGNANCY IN THIRD TRIMESTER: Primary | ICD-10-CM

## 2018-03-06 PROCEDURE — 99207 ZZC PRENATAL VISIT: CPT | Performed by: ADVANCED PRACTICE MIDWIFE

## 2018-03-06 PROCEDURE — 87653 STREP B DNA AMP PROBE: CPT | Performed by: ADVANCED PRACTICE MIDWIFE

## 2018-03-06 PROCEDURE — 59025 FETAL NON-STRESS TEST: CPT | Performed by: ADVANCED PRACTICE MIDWIFE

## 2018-03-06 PROCEDURE — 87641 MR-STAPH DNA AMP PROBE: CPT | Performed by: ADVANCED PRACTICE MIDWIFE

## 2018-03-06 PROCEDURE — 87640 STAPH A DNA AMP PROBE: CPT | Mod: XU | Performed by: ADVANCED PRACTICE MIDWIFE

## 2018-03-06 NOTE — NURSING NOTE
"Chief Complaint   Patient presents with     Prenatal Care     36 weeks 1 day- decrease fetal movements last 2 days- pain on right side rib cage        Initial /60 (BP Location: Left arm, Patient Position: Sitting, Cuff Size: Adult Large)  Wt 244 lb (110.7 kg)  LMP 06/10/2017  Breastfeeding? No  BMI 41.88 kg/m2 Estimated body mass index is 41.88 kg/(m^2) as calculated from the following:    Height as of 12/22/17: 5' 4\" (1.626 m).    Weight as of this encounter: 244 lb (110.7 kg).  Medication Reconciliation: complete     Decreased fetal movements  -Contractions    36w1d  Espinoza Perkins CMA       "

## 2018-03-06 NOTE — MR AVS SNAPSHOT
"              After Visit Summary   3/6/2018    Sarthak Barnes    MRN: [id number]           Patient Information     Date Of Birth          1995        Visit Information        Provider Department      3/6/2018 4:00 PM Magi Nolasco APRN CNM [facility]        Today's Diagnoses     Encounter for supervision of normal first pregnancy in third trimester    -  1    MRSA carrier           Follow-ups after your visit        Follow-up notes from your care team     Return in about 1 week (around 3/13/2018) for Prenatal visit.      Future tests that were ordered for you today     Open Future Orders        Priority Expected Expires Ordered    US Fetal Biophys Prof w/o Non Stress Test Routine 3/7/2018 3/6/2019 3/6/2018            Who to contact     If you have questions or need follow up information about today's clinic visit or your schedule please contact [facility] directly at [phone].  Normal or non-critical lab and imaging results will be communicated to you by MyChart, letter or phone within 4 business days after the clinic has received the results. If you do not hear from us within 7 days, please contact the clinic through MyChart or phone. If you have a critical or abnormal lab result, we will notify you by phone as soon as possible.  Submit refill requests through Biz360 or call your pharmacy and they will forward the refill request to us. Please allow 3 business days for your refill to be completed.          Additional Information About Your Visit        MyChart Information     Biz360 lets you send messages to your doctor, view your test results, renew your prescriptions, schedule appointments and more. To sign up, go to [email or web address] . Click on \"Log in\" on the left side of the screen, which will take you to the Welcome page. Then click on \"Sign up Now\" on the right side of the page.     You will be asked to enter the access code listed below, as well " as some personal information. Please follow the directions to create your username and password.     Your access code is: [email or web address]  Expires: 5/10/2018 12:14 PM     Your access code will  in 90 days. If you need help or a new code, please call your [place] clinic or [phone].        Care EveryWhere ID     This is your Care EveryWhere ID. This could be used by other organizations to access your [place] medical records  [id number]        Your Vitals Were     Last Period Breastfeeding? BMI (Body Mass Index)             06/10/2017 No 41.88 kg/m2          Blood Pressure from Last 3 Encounters:   18 120/60   18 126/60   18 125/80    Weight from Last 3 Encounters:   18 244 lb (110.7 kg)   18 242 lb (109.8 kg)   01/15/18 229 lb 4.8 oz (104 kg)              We Performed the Following     FETAL NON-STRESS TEST     Group B strep PCR     Methicillin Resist/Sens S. aureus PCR        Primary Care Provider Office Phone # Fax #    [facility] [phone]       [address] [place] [address]        Equal Access to Services     SONIA JUÁREZ : Hadii aad ku hadasho Soomaali, waaxda luqadaha, qaybta kaalmada adeegyada, kaveh wasserman. So [facility] [phone].    ATENCIÓN: Si habla español, tiene a martins disposición servicios gratuitos de asistencia lingüística. Llame al [phone].    We comply with applicable federal civil rights laws and [place] laws. We do not discriminate on the basis of race, color, national origin, age, disability, sex, sexual orientation, or gender identity.            Thank you!     Thank you for choosing [facility]  for your care. Our goal is always to provide you with excellent care. Hearing back from our patients is one way we can continue to improve our services. Please take a few minutes to complete the written survey that you may receive in the mail after your visit with us.  Thank you!             Your Updated Medication List - Protect others around you: Learn how to safely use, store and throw away your medicines at www.disposemymeds.org.          This list is accurate as of 3/6/18  5:32 PM.  Always use your most recent med list.                   Brand Name Dispense Instructions for use Diagnosis    prenatal multivitamin plus iron 27-0.8 MG Tabs per tablet      Take 1 tablet by mouth daily

## 2018-03-07 LAB
MRSA DNA SPEC QL NAA+PROBE: NEGATIVE
SPECIMEN SOURCE: NORMAL

## 2018-03-08 LAB
GP B STREP DNA SPEC QL NAA+PROBE: NEGATIVE
SPECIMEN SOURCE: NORMAL

## 2018-03-12 ENCOUNTER — RADIANT APPOINTMENT (OUTPATIENT)
Dept: ULTRASOUND IMAGING | Facility: CLINIC | Age: 23
End: 2018-03-12
Attending: ADVANCED PRACTICE MIDWIFE
Payer: MEDICAID

## 2018-03-12 DIAGNOSIS — Z34.03 ENCOUNTER FOR SUPERVISION OF NORMAL FIRST PREGNANCY IN THIRD TRIMESTER: ICD-10-CM

## 2018-03-12 DIAGNOSIS — Z34.83 ENCOUNTER FOR SUPERVISION OF OTHER NORMAL PREGNANCY IN THIRD TRIMESTER: Primary | ICD-10-CM

## 2018-03-12 PROCEDURE — 76816 OB US FOLLOW-UP PER FETUS: CPT | Performed by: OBSTETRICS & GYNECOLOGY

## 2018-03-15 ENCOUNTER — HOSPITAL ENCOUNTER (OUTPATIENT)
Dept: ULTRASOUND IMAGING | Facility: CLINIC | Age: 23
Discharge: HOME | End: 2018-03-15
Attending: ADVANCED PRACTICE MIDWIFE | Admitting: ADVANCED PRACTICE MIDWIFE
Payer: MEDICAID

## 2018-03-15 DIAGNOSIS — Z34.83 ENCOUNTER FOR SUPERVISION OF OTHER NORMAL PREGNANCY IN THIRD TRIMESTER: ICD-10-CM

## 2018-03-15 PROCEDURE — 76819 FETAL BIOPHYS PROFIL W/O NST: CPT

## 2018-03-16 ENCOUNTER — HOSPITAL ENCOUNTER (OUTPATIENT)
Dept: ULTRASOUND IMAGING | Facility: CLINIC | Age: 23
Discharge: HOME | End: 2018-03-16
Attending: ADVANCED PRACTICE MIDWIFE | Admitting: ADVANCED PRACTICE MIDWIFE
Payer: MEDICAID

## 2018-03-16 ENCOUNTER — OFFICE VISIT (OUTPATIENT)
Dept: MATERNAL FETAL MEDICINE | Facility: CLINIC | Age: 23
End: 2018-03-16
Attending: ADVANCED PRACTICE MIDWIFE
Payer: MEDICAID

## 2018-03-16 ENCOUNTER — PRE VISIT (OUTPATIENT)
Dept: MATERNAL FETAL MEDICINE | Facility: CLINIC | Age: 23
End: 2018-03-16

## 2018-03-16 DIAGNOSIS — O26.90 PREGNANCY RELATED CONDITION, ANTEPARTUM: ICD-10-CM

## 2018-03-16 DIAGNOSIS — Z34.83 ENCOUNTER FOR SUPERVISION OF OTHER NORMAL PREGNANCY, THIRD TRIMESTER: Primary | ICD-10-CM

## 2018-03-16 DIAGNOSIS — Z03.73 SUSPECTED FETAL ANOMALY NOT FOUND: Primary | ICD-10-CM

## 2018-03-16 PROCEDURE — 76811 OB US DETAILED SNGL FETUS: CPT

## 2018-03-16 NOTE — PROGRESS NOTES
Reviewed ultrasound with patient.  Discussed recommendation for [facility] ultrasound to evaluate fetal bladder enlargement.  Patient had many questions, discussed questions.  Patient was transferred to [facility] appointment line to make US appointment.  SHERON Worthy, TAMIE

## 2018-03-16 NOTE — PROGRESS NOTES
"Please see \"Imaging\" tab under \"Chart Review\" for details of today's US at the [facility].    Mani Jordan MD  Maternal-Fetal Medicine    "

## 2018-03-20 ENCOUNTER — TELEPHONE (OUTPATIENT)
Dept: OBGYN | Facility: CLINIC | Age: 23
End: 2018-03-20

## 2018-03-20 ENCOUNTER — HOSPITAL ENCOUNTER (OUTPATIENT)
Facility: CLINIC | Age: 23
Discharge: HOME | End: 2018-03-20
Attending: ADVANCED PRACTICE MIDWIFE | Admitting: ADVANCED PRACTICE MIDWIFE
Payer: MEDICAID

## 2018-03-20 VITALS — SYSTOLIC BLOOD PRESSURE: 120 MMHG | RESPIRATION RATE: 18 BRPM | DIASTOLIC BLOOD PRESSURE: 70 MMHG | TEMPERATURE: 98.2 F

## 2018-03-20 DIAGNOSIS — N30.00 ACUTE CYSTITIS WITHOUT HEMATURIA: Primary | ICD-10-CM

## 2018-03-20 LAB
A1 MICROGLOB PLACENTAL VAG QL: NEGATIVE
ALBUMIN UR-MCNC: 30 MG/DL
APPEARANCE UR: ABNORMAL
BACTERIA #/AREA URNS HPF: ABNORMAL /HPF
BILIRUB UR QL STRIP: NEGATIVE
CAOX CRY #/AREA URNS HPF: ABNORMAL /HPF
COLOR UR AUTO: YELLOW
FERN CRYSTALLIZATION: NORMAL
GLUCOSE UR STRIP-MCNC: NEGATIVE MG/DL
HGB UR QL STRIP: NEGATIVE
KETONES UR STRIP-MCNC: NEGATIVE MG/DL
LEUKOCYTE ESTERASE UR QL STRIP: ABNORMAL
MUCOUS THREADS #/AREA URNS LPF: PRESENT /LPF
NITRATE UR QL: NEGATIVE
PH UR STRIP: 6 PH (ref 5–7)
RBC #/AREA URNS AUTO: 5 /HPF (ref 0–2)
SOURCE: ABNORMAL
SP GR UR STRIP: 1.02 (ref 1–1.03)
SPECIMEN SOURCE: NORMAL
SQUAMOUS #/AREA URNS AUTO: 7 /HPF (ref 0–1)
UROBILINOGEN UR STRIP-MCNC: 2 MG/DL (ref 0–2)
WBC #/AREA URNS AUTO: 4 /HPF (ref 0–5)
WET PREP SPEC: NORMAL

## 2018-03-20 PROCEDURE — 87210 SMEAR WET MOUNT SALINE/INK: CPT | Performed by: ADVANCED PRACTICE MIDWIFE

## 2018-03-20 PROCEDURE — G0463 HOSPITAL OUTPT CLINIC VISIT: HCPCS

## 2018-03-20 PROCEDURE — 81001 URINALYSIS AUTO W/SCOPE: CPT | Performed by: ADVANCED PRACTICE MIDWIFE

## 2018-03-20 PROCEDURE — 84112 EVAL AMNIOTIC FLUID PROTEIN: CPT | Performed by: ADVANCED PRACTICE MIDWIFE

## 2018-03-20 PROCEDURE — 87086 URINE CULTURE/COLONY COUNT: CPT | Performed by: ADVANCED PRACTICE MIDWIFE

## 2018-03-20 RX ORDER — ONDANSETRON 2 MG/ML
4 INJECTION INTRAMUSCULAR; INTRAVENOUS EVERY 6 HOURS PRN
Status: DISCONTINUED | OUTPATIENT
Start: 2018-03-20 | End: 2018-03-20 | Stop reason: HOSPADM

## 2018-03-20 RX ORDER — AMOXICILLIN 500 MG/1
500 CAPSULE ORAL 3 TIMES DAILY
Qty: 30 CAPSULE | Refills: 0 | Status: SHIPPED | OUTPATIENT
Start: 2018-03-20 | End: 2018-05-17

## 2018-03-20 NOTE — PLAN OF CARE
Data: Patient [facility] the [facility] at 1228.   Reason for maternal/fetal assessment per patient is Rule out rupture of membranes  . Patient is a . Prenatal record reviewed.      Obstetric History       T1      L1     SAB1   TAB0   Ectopic0   Multiple0   Live Births1       # Outcome Date GA Lbr Logan/2nd Weight Sex Delivery Anes PTL Lv   3 Current            2 Term 10/18/14 40w0d   M Vag-Spont   JAVAD   1 SAB      SAB            Medical History:   Past Medical History:   Diagnosis Date     Migraine      MRSA (methicillin resistant staph aureus) culture positive     was told at a walk in clinic that she has MRSA and was treated, no reoccurance.    . Gestational Age 38w1d. VSS. Cervix: not examined.  Fetal movement present. Patient denies cramping, backache, vaginal discharge, pelvic pressure, UTI symptoms, GI problems, bloody show, vaginal bleeding, edema, headache, visual disturbances, epigastric or URQ pain, abdominal pain, rupture of membranes. Support persons FOB present.  Action: Verbal consent for EFM. Triage assessment completed. EFM applied for fetal wellbeing. Uterine assessment with TOCO. Fetal assessment: Presumed adequate fetal oxygenation documented (see flow record). Patient instructed to report change in fetal movement, vaginal leaking of fluid or bleeding, abdominal pain, or any concerns related to the pregnancy to her nurse/physician.   Response: CNM  informed of see note. Plan per provider is discharge to home with abx. Patient verbalized understanding of education and verbalized agreement with plan. Discharged ambulatory at 1500.

## 2018-03-20 NOTE — PROVIDER NOTIFICATION
03/20/18 1330   Provider Notification   Provider Name/Title Graciela Perkins   Method of Notification Phone   Fern collected and came back with no ferning present. Light trickle of clear fluid at perineum on collection. Updated CNM on test, fluid, pt status and FHT. Orders to send UA, amnisure and wet prep.

## 2018-03-20 NOTE — DISCHARGE INSTRUCTIONS
Discharge Instruction for Undelivered Patients      You were seen for: Membrane Assessment  We Consulted: Graciela Perkins CNM  You had (Test or Medicine):amnisure, Fern, UA, fetal monitoring     Diet:   Drink 8 to 12 glasses of liquids (milk, juice, water) every day.     Activity:  Call your doctor or nurse midwife if your baby is moving less than usual.     Call your provider if you notice:  Swelling in your face or increased swelling in your hands or legs.  Headaches that are not relieved by Tylenol (acetaminophen).  Changes in your vision (blurring: seeing spots or stars.)  Nausea (sick to your stomach) and vomiting (throwing up).   Weight gain of 5 pounds or more per week.  Heartburn that doesn't go away.  Signs of bladder infection: pain when you urinate (use the toilet), need to go more often and more urgently.  The bag of gipson (rupture of membranes) breaks, or you notice leaking in your underwear.  Bright red blood in your underwear.  Abdominal (lower belly) or stomach pain.  For first baby: Contractions (tightening) less than 5 minutes apart for one hour or more.  Second (plus) baby: Contractions (tightening) less than 10 minutes apart and getting stronger.  *If less than 34 weeks: Contractions (tightenings) more than 6 times in one hour.  Increase or change in vaginal discharge (note the color and amount)      Follow-up:  As scheduled in the clinic

## 2018-03-20 NOTE — PROGRESS NOTES
Baseline 130, moderate variability. No decels noted, Accels present. Reactive NST. No contractions appreciated. . SHERON Robledo, CNM

## 2018-03-20 NOTE — IP AVS SNAPSHOT
[facility]    [address]    [facility] [address]    Phone:  [phone]    Fax:  [phone]                                       After Visit Summary   3/20/2018    Sarthak Barnes    MRN: [id number]           After Visit Summary Signature Page     I have received my discharge instructions, and my questions have been answered. I have discussed any challenges I see with this plan with the nurse or doctor.    ..........................................................................................................................................  Patient/Patient Representative Signature      ..........................................................................................................................................  Patient Representative Print Name and Relationship to Patient    ..................................................               ................................................  Date                                            Time    ..........................................................................................................................................  Reviewed by Signature/Title    ...................................................              ..............................................  Date                                                            Time

## 2018-03-20 NOTE — PLAN OF CARE
Data: [facility] presented to [facility]: 3/20/2018 12:28 PM.  Reason for maternal/fetal assessment is leaking vaginal fluid. Patient reports Leaking clear vaginal fluid since 1800 last night.  Patient is a .  Prenatal record reviewed. Pregnancy has been uncomplicated..  Gestational Age 38w1d. VSS. Fetal movement present. Patient denies uterine contractions, vaginal bleeding, abdominal pain, pelvic pressure. Support person is present.   Action: Verbal consent for EFM. Triage assessment completed. Bill of rights reviewed.  Response: Patient verbalized agreement with plan. Will contact Dr Graciela Perkins with update and further orders.

## 2018-03-20 NOTE — TELEPHONE ENCOUNTER
Pt calling with concerns of some leaking of clear fluid from her vag since last evening.   Pt states that it is not a lot, but has continued throughout the night.     Baby is active.     Denies contractions.     Pt sent to [facility] L[facility]D to r/u ROM.     Midwife group paged.     JH Null RN

## 2018-03-20 NOTE — PROVIDER NOTIFICATION
03/20/18 1500   Provider Notification   Provider Name/Title Graciela Perkins   Method of Notification Phone   CNM updated on labs, FHT and pt status. Orders to discharge to home with Rx for abx.

## 2018-03-20 NOTE — PROGRESS NOTES
[facility] CNM TRIAGE NOTE    Sarthak Barnes is a 23 year old  with and IUP at 38w1d who presents with complaint of possible rupture of membranes. Reports slow leak of clear fluid since last evening    Patient states baby is active.  Unsure if ROM   Denies vaginal bleeding  Present OB History at [facility] with the CNMs.     Problems this pregnancy:   1.   Patient Active Problem List   Diagnosis     Supervision of normal pregnancy     MRSA (methicillin resistant staph aureus) culture positive     Migraine     Indication for care in labor and delivery, antepartum     Encounter for triage in pregnant patient     Indication for care in labor or delivery         ROS:  Patient is alert and oriented  Reports no pain.   Baby has been active.    PHYSICAL EXAM:  /70  Temp 98.2  F (36.8  C) (Oral)  Resp 18  LMP 06/10/2017    FHT's 135 with moderate variability  Accelerations: present   Decelerations:  absent        Contractions: Pt is not tobi     Results for orders placed or performed during the hospital encounter of 18   UA with Microscopic reflex to Culture   Result Value Ref Range    Color Urine Yellow     Appearance Urine Slightly Cloudy     Glucose Urine Negative NEG^Negative mg/dL    Bilirubin Urine Negative NEG^Negative    Ketones Urine Negative NEG^Negative mg/dL    Specific Gravity Urine 1.023 1.003 - 1.035    Blood Urine Negative NEG^Negative    pH Urine 6.0 5.0 - 7.0 pH    Protein Albumin Urine 30 (A) NEG^Negative mg/dL    Urobilinogen mg/dL 2.0 0.0 - 2.0 mg/dL    Nitrite Urine Negative NEG^Negative    Leukocyte Esterase Urine Large (A) NEG^Negative    Source Midstream Urine     WBC Urine 4 0 - 5 /HPF    RBC Urine 5 (H) 0 - 2 /HPF    Bacteria Urine Few (A) NEG^Negative /HPF    Squamous Epithelial /HPF Urine 7 (H) 0 - 1 /HPF    Mucous Urine Present (A) NEG^Negative /LPF    Calcium Oxalate Moderate (A) NEG^Negative /HPF   Fern and nitrazine test   Result Value Ref  Range    Fern Crystallization No Ferning Present    Rupture of membranes by Amnisure   Result Value Ref Range    Amnisure Negative NEG^Negative   Wet prep   Result Value Ref Range    Specimen Description Vagina     Wet Prep Few  PMNs seen       Wet Prep No clue cells seen     Wet Prep No Trichomonas seen     Wet Prep No yeast seen            Membranes are intact per ferning and amnisure.  UA indicates possible UTI      ASSESSMENT :   23 year old  with ferrari IUP 38w1d not in labor  NST  reactive  GBS negative and membranes intact  UTI      PLAN:  Discharge home  Continue routine prenatal care    Started on antibiotics for UTI, UC pending.  Encouraged patient to call with any questions or concerns.      Teaching done r/t to s/s of labor, SROM, decreased fetal movement, comfort measures in third trimester.  Instructed to please refer to the discharge handouts, the RN triage line or on-call CNM for any questions or concerns.  Pt verbalizes understanding and agreement with current plan of care.    GARY FINN CNM

## 2018-03-20 NOTE — IP AVS SNAPSHOT
MRN:[id number]                      After Visit Summary   3/20/2018    Sarthak Barnes    MRN: [id number]           Thank you!     Thank you for choosing [facility] for your care. Our goal is always to provide you with excellent care. Hearing back from our patients is one way we can continue to improve our services. Please take a few minutes to complete the written survey that you may receive in the mail after you visit. If you would like to speak to someone directly about your visit please contact Patient Relations at [phone]. Thank you!          Patient Information     Date Of Birth          1995        About your hospital stay     You were admitted on:  March 20, 2018 You last received care in the:  [facility] Labor and Delivery    You were discharged on:  March 20, 2018       Who to Call     For medical emergencies, please call 911.  For non-urgent questions about your medical care, please call your primary care provider or clinic, [phone]          Attending Provider     Provider Graciela Nick CNM OB/Gyn       Primary Care Provider Office Phone # Fax #    [facility] [phone]      Your next 10 appointments already scheduled     Mar 22, 2018  3:30 PM CDT   ESTABL[facility]EN[facility] with SHERON Harrington CN[facility]    [address]  [facility] [address] [phone]              Further instructions from your care team       Discharge Instruction for Undelivered Patients      You were seen for: Membrane Assessment  We Consulted: Graciela Perkins CNM  You had (Test or Medicine):amnisure, Fern, UA, fetal monitoring     Diet:   Drink 8 to 12 glasses of liquids (milk, juice, water) every day.     Activity:  Call your doctor or nurse midwife if your baby is moving less than usual.     Call your provider if you notice:  Swelling in your face or  "increased swelling in your hands or legs.  Headaches that are not relieved by Tylenol (acetaminophen).  Changes in your vision (blurring: seeing spots or stars.)  Nausea (sick to your stomach) and vomiting (throwing up).   Weight gain of 5 pounds or more per week.  Heartburn that doesn't go away.  Signs of bladder infection: pain when you urinate (use the toilet), need to go more often and more urgently.  The bag of gipson (rupture of membranes) breaks, or you notice leaking in your underwear.  Bright red blood in your underwear.  Abdominal (lower belly) or stomach pain.  For first baby: Contractions (tightening) less than 5 minutes apart for one hour or more.  Second (plus) baby: Contractions (tightening) less than 10 minutes apart and getting stronger.  *If less than 34 weeks: Contractions (tightenings) more than 6 times in one hour.  Increase or change in vaginal discharge (note the color and amount)      Follow-up:  As scheduled in the clinic          Pending Results     Date and Time Order Name Status Description    3/20/2018 1345 Urine Culture Aerobic Bacterial In process             Admission Information     Date & Time Provider Department Dept. Phone    3/20/2018 Graciela Perkins, TAMIE [facility] Labor and Delivery [phone]      Your Vitals Were     Blood Pressure Temperature Respirations Last Period          120/70 98.2  F (36.8  C) (Oral) 18 06/10/2017        [employer] Information     [employer] lets you send messages to your doctor, view your test results, renew your prescriptions, schedule appointments and more. To sign up, go to [email or web address] . Click on \"Log in\" on the left side of the screen, which will take you to the Welcome page. Then click on \"Sign up Now\" on the right side of the page.     You will be asked to enter the access code listed below, as well as some personal information. Please follow the directions to create your username and password.     Your access code is: " [email or web address]  Expires: 5/10/2018  1:14 PM     Your access code will  in 90 days. If you need help or a new code, please call your [place] clinic or [phone].        Care EveryWhere ID     This is your Care EveryWhere ID. This could be used by other organizations to access your [place] medical records  [id number]        Equal Access to Services     [facility]: Hadii aad ku hadasho Soomaali, waaxda luqadaha, qaybta kaalmada adeegyada, waxay raynein hayaan adereese gormandebbiesayra lazarina . So [facility] [phone].    ATENCIÓN: Si habla español, tiene a martins disposición servicios gratuitos de asistencia lingüística. Llame al [phone].    We comply with applicable federal civil rights laws and [place] laws. We do not discriminate on the basis of race, color, national origin, age, disability, sex, sexual orientation, or gender identity.               Review of your medicines      UNREVIEWED medicines. Ask your doctor about these medicines        Dose / Directions    prenatal multivitamin plus iron 27-0.8 MG Tabs per tablet        Dose:  1 tablet   Take 1 tablet by mouth daily   Refills:  0                Protect others around you: Learn how to safely use, store and throw away your medicines at www.disposemymeds.org.             Medication List: This is a list of all your medications and when to take them. Check marks below indicate your daily home schedule. Keep this list as a reference.      Medications           Morning Afternoon Evening Bedtime As Needed    prenatal multivitamin plus iron 27-0.8 MG Tabs per tablet   Take 1 tablet by mouth daily

## 2018-03-21 LAB
BACTERIA SPEC CULT: NORMAL
Lab: NORMAL
SPECIMEN SOURCE: NORMAL

## 2018-04-04 ENCOUNTER — PRENATAL OFFICE VISIT (OUTPATIENT)
Dept: OBGYN | Facility: CLINIC | Age: 23
End: 2018-04-04
Payer: MEDICAID

## 2018-04-04 VITALS
SYSTOLIC BLOOD PRESSURE: 112 MMHG | BODY MASS INDEX: 41.25 KG/M2 | DIASTOLIC BLOOD PRESSURE: 74 MMHG | WEIGHT: 241.6 LBS | HEIGHT: 64 IN

## 2018-04-04 DIAGNOSIS — N89.8 VAGINAL DISCHARGE: ICD-10-CM

## 2018-04-04 DIAGNOSIS — Z34.03 ENCOUNTER FOR SUPERVISION OF NORMAL FIRST PREGNANCY IN THIRD TRIMESTER: Primary | ICD-10-CM

## 2018-04-04 LAB
RUPTURE OF FETAL MEMBRANES BY ROM PLUS: NEGATIVE
SPECIMEN SOURCE: NORMAL
WET PREP SPEC: NORMAL

## 2018-04-04 PROCEDURE — 59425 ANTEPARTUM CARE ONLY: CPT | Mod: 24 | Performed by: ADVANCED PRACTICE MIDWIFE

## 2018-04-04 PROCEDURE — 87210 SMEAR WET MOUNT SALINE/INK: CPT | Performed by: ADVANCED PRACTICE MIDWIFE

## 2018-04-04 PROCEDURE — 84112 EVAL AMNIOTIC FLUID PROTEIN: CPT | Performed by: ADVANCED PRACTICE MIDWIFE

## 2018-04-04 PROCEDURE — 99207 ZZC PRENATAL VISIT: CPT | Performed by: ADVANCED PRACTICE MIDWIFE

## 2018-04-04 NOTE — MR AVS SNAPSHOT
After Visit Summary   4/4/2018    Sarthak Barnes    MRN: [id number]           Patient Information     Date Of Birth          1995        Visit Information        Provider Department      4/4/2018 2:45 PM Graciela Perkins CNM [facility]        Today's Diagnoses     Encounter for supervision of normal first pregnancy in third trimester    -  1    Vaginal discharge          Care Instructions    Weeks 37 thru 40 - Gestational Age (Fetal Age - Weeks 35 thru 38):  At 38 weeks the fetus is considered full term and is ready to make its appearance at any time. As your baby becomes bigger, you may notice a change in fetal movement. If you notice a decrease in fetal movement, make sure to talk with your doctor. The fingernails have grown long and will need to be cut soon after birth. Small breast buds are present on both sexes. The mother is supplying the fetus with antibodies that will help protect against disease. All organs are developed, with the lungs maturing all the way until the day of delivery. The fetus is about 19 - 21 inches in length and weighs anywhere from 6   lbs to 10 lbs.    Labor and Childbirth: Active Labor  During active labor, your contractions will be stronger and more rhythmic than with early labor. They peak and subside like waves. They may happen 3 to 5 minutes apart and last about 45 to 60 seconds. This part of labor can be hard work. But it is often shorter than early labor. When you reach active labor, exams and tests will be done to see how you and your baby are doing.     Your cervix may dilate 4 to 8 centimeters during the first part of active labor.   Evaluating you and your baby  An exam tells how you and your baby are responding to contractions. Your blood pressure, temperature, and pulse will be checked. A blood or urine sample may also be taken. A fetal monitor will be used to check your baby s heart rate. Sometimes an IV (intravenous) line is started  to give you medicine and fluids.  Moving ahead with labor  You may now feel contractions in your whole stomach instead of just the lower part (like during early labor). If your amniotic sac has not broken already, it may break now. Or, it may be broken for you. To help your baby descend, change position often. Walking or sitting in a rocking chair or recliner may help. You may find it hard to relax even though you are tired. You may also be less interested in talking than you were earlier. If you re having anesthesia, you will get it now.   Special issues during labor  If labor doesn t progress well or a problem arises, you may need a . But your healthcare providers may take certain steps to help you avoid a :    If your cervix isn t dilating, a medicine (oxytocin) may be used to augment labor.    If fetal monitoring shows your baby isn t getting enough oxygen, shifting your body position may help. You may also be given oxygen through a mask.    If you have preeclampsia (a condition that results in high blood pressure, swelling, and other symptoms), you may be given medicines by IV (intravenous). Your healthcare provider may also tell you to lie on your left side.  Responding to contractions  During contractions, try to stay relaxed. Tense muscles use more oxygen, eat up your body s energy, and increase pain. Use the breathing and relaxation techniques you may have learned. And let your support person know how he or she can help. If you ve had problems during a previous birth, focus on the present. Keep in mind that no 2 births are the same.     Support person s note  Here's how you can help:    Have the mother walk or change positions at least once an hour. This improves circulation and helps the baby descend.    Keep reminding the mother to breathe and relax through each contraction.    Reassure her. Try to keep her from getting anxious or overstressed.    Take care of yourself. Take a short break  to eat or go to the bathroom when you need to.    Rest when the mother does. You ll both benefit.   Date Last Reviewed: 8/16/2015 2000-2017 [employer]. [address] [place] [address], [place] [address]. All rights reserved. This information is not intended as a substitute for professional medical care. Always follow your healthcare professional's instructions.        Stages of Labor    Labor has 3 stages. Your healthcare provider may talk about the progress of your labor with certain words. One of these is your baby s position. Another is your baby s station. And the effacement and dilation of your cervix will be noted. Read below to learn about these terms and the 3 stages of labor.  Your baby moves into position  Position is your baby s placement in your uterus. Your baby may be facing left or right. He or she may be head first or feet first. Station refers to how far your baby has moved down into your pelvic cavity.  First stage of labor  During the first stage of labor, contractions of the uterus help your cervix thin (efface). They also help it widen (dilate). This will help your baby pass through the birth canal (vagina). At first your contractions will not come that often or last that long. But as time passes, they will come more often, they may be more painful, and they will last longer. They will then be 5 to 30 minutes apart. They will last about 30 to 45 seconds each.  Second stage of labor  In the second stage of labor, you will have stronger contractions of your uterus. They may happen every 3 to 5 minutes. They may last from 45 to 90 seconds each. Your baby will move down the birth canal. Your healthcare provider will ask you to push with each contraction. Try to rest between the contractions if you can. Your baby is delivered at the end of this stage of labor.  Third stage of labor  The third stage of labor comes after your baby is born. This is when the afterbirth (placenta) comes out  "of your uterus. Your uterus will continue to contract. But the contractions are much milder than before.  Date Last Reviewed: 2015-2017 [employer]. [address] [place] [address], [place] [address]. All rights reserved. This information is not intended as a substitute for professional medical care. Always follow your healthcare professional's instructions.                Follow-ups after your visit        Follow-up notes from your care team     Return in about 1 week (around 2018) for Prenatal Care.      Who to contact     If you have questions or need follow up information about today's clinic visit or your schedule please contact [facility] directly at [phone].  Normal or non-critical lab and imaging results will be communicated to you by [employer], letter or phone within 4 business days after the clinic has received the results. If you do not hear from us within 7 days, please contact the clinic through [employer] or phone. If you have a critical or abnormal lab result, we will notify you by phone as soon as possible.  Submit refill requests through [employer] or call your pharmacy and they will forward the refill request to us. Please allow 3 business days for your refill to be completed.          Additional Information About Your Visit        [employer] Information     [employer] lets you send messages to your doctor, view your test results, renew your prescriptions, schedule appointments and more. To sign up, go to [email or web address] . Click on \"Log in\" on the left side of the screen, which will take you to the Welcome page. Then click on \"Sign up Now\" on the right side of the page.     You will be asked to enter the access code listed below, as well as some personal information. Please follow the directions to create your username and password.     Your access code is: [email or web address]  Expires: 2018  3:47 PM     Your access code will  in 90 days. If you need help " "or a new code, please call your [place] clinic or [phone].        Care EveryWhere ID     This is your Care EveryWhere ID. This could be used by other organizations to access your [place] medical records  [id number]        Your Vitals Were     Height Last Period BMI (Body Mass Index)             5' 4\" (1.626 m) 06/10/2017 41.47 kg/m2          Blood Pressure from Last 3 Encounters:   04/04/18 112/74   03/20/18 120/70   03/06/18 120/60    Weight from Last 3 Encounters:   04/04/18 241 lb 9.6 oz (109.6 kg)   03/06/18 244 lb (110.7 kg)   02/22/18 242 lb (109.8 kg)              We Performed the Following     Rupture of Fetal Membranes by ROM Plus     Wet prep        Primary Care Provider Office Phone # Fax #    [facility] [phone]       [address] [place] [address]        Equal Access to Services     SONIA JUÁREZ : Hadii aad ku hadasho Soomaali, waaxda luqadaha, qaybta kaalmada adeegyada, waxay idiin haydelonten светлана lema . So [facility] [phone].    ATENCIÓN: Si habla español, tiene a martins disposición servicios gratuitos de asistencia lingüística. Llame al [phone].    We comply with applicable federal civil rights laws and [place] laws. We do not discriminate on the basis of race, color, national origin, age, disability, sex, sexual orientation, or gender identity.            Thank you!     Thank you for choosing [facility]  for your care. Our goal is always to provide you with excellent care. Hearing back from our patients is one way we can continue to improve our services. Please take a few minutes to complete the written survey that you may receive in the mail after your visit with us. Thank you!             Your Updated Medication List - Protect others around you: Learn how to safely use, store and throw away your medicines at www.disposemymeds.org.          This list is accurate as of 4/4/18  4:09 PM.  Always use your most recent med " list.                   Brand Name Dispense Instructions for use Diagnosis    amoxicillin 500 MG capsule    AMOXIL    30 capsule    Take 1 capsule (500 mg) by mouth 3 times daily    Acute cystitis without hematuria       prenatal multivitamin plus iron 27-0.8 MG Tabs per tablet      Take 1 tablet by mouth daily

## 2018-04-04 NOTE — PROGRESS NOTES
"S:   Feels well,  Baby active.  Denies regular uterine cramping or vaginal bleeding. Reports leaking of \"greenish, phlemlike\" fluid x2 weeks. Lost mucous plug x 2 day ago. No headache, increase in edema, no epigastric pain.     O:   Vitals: /74  Ht 5' 4\" (1.626 m)  Wt 241 lb 9.6 oz (109.6 kg)  LMP 06/10/2017  BMI 41.47 kg/m2  BMI= Body mass index is 41.47 kg/(m^2).    Exam:  Constitutional: healthy, alert and no distress  Respiratory: respirations even and unlabored  Gastrointestinal: Abdomen soft, non-tender. Fundus measures appropriate for gestational age. Fetal heart tones hear without difficulty and within normal limits  : Normal external genitalia without lesions  Psychiatric: mentation appears normal and affect normal/bright    Pelvic Exam:  Vulva: No external lesions, normal hair distribution, no adenopathy  Vagina: Moist, pink, no abnormal discharge, well rugated, no lesions; ROM + and wet prep collected  Cervix: Soft, anterior, parous, smooth, 3.5/50/-; fetus in cephalic position  Uterus: Normal size for gestation  Ovaries: Not examined  Rectal exam: Deferred    Results for orders placed or performed in visit on 04/04/18   Rupture of Fetal Membranes by ROM Plus   Result Value Ref Range    Rupture of Fetal Membranes by ROM Plus Negative    Wet prep   Result Value Ref Range    Specimen Description Vagina     Wet Prep No Trichomonas seen     Wet Prep No yeast seen     Wet Prep No clue cells seen        A:  Pregnancy progressing as expected.    ICD-10-CM    1. Encounter for supervision of normal first pregnancy in third trimester Z34.03    2. Vaginal discharge N89.8 Rupture of Fetal Membranes by ROM Plus     Wet prep     P:   Plan to induce labor on 4/9/18. Patient to call [facility] at 0630 on 4/9, then report to unit at 0730 if availability exists.  Labor signs and symptoms discussed, aware of numbers to call.  Discussed warning signs of PIH/preeclampsia and patient will monitor.  GBS " screen completed. Discussed plans for labor; patient planning epidural.   Discussed patient options for postpartum contraception. Patient is unsure but is interested in learning more about Nexplanon. Has used DMPA in the past and did not like as she experienced irregular vaginal bleeding. Patient also considering OCPs.  Encouraged patient to call with any questions or concerns.          SHERON Robledo, CNM

## 2018-04-04 NOTE — NURSING NOTE
"Chief Complaint   Patient presents with     Prenatal Care     Leaking fluid for last 2 weeks.    Initial /74  Ht 5' 4\" (1.626 m)  Wt 241 lb 9.6 oz (109.6 kg)  LMP 06/10/2017  BMI 41.47 kg/m2 Estimated body mass index is 41.47 kg/(m^2) as calculated from the following:    Height as of this encounter: 5' 4\" (1.626 m).    Weight as of this encounter: 241 lb 9.6 oz (109.6 kg).  Medication Reconciliation: complete     Tisha Grimaldo, CORIE      "

## 2018-04-04 NOTE — PATIENT INSTRUCTIONS
Weeks 37 thru 40 - Gestational Age (Fetal Age - Weeks 35 thru 38):  At 38 weeks the fetus is considered full term and is ready to make its appearance at any time. As your baby becomes bigger, you may notice a change in fetal movement. If you notice a decrease in fetal movement, make sure to talk with your doctor. The fingernails have grown long and will need to be cut soon after birth. Small breast buds are present on both sexes. The mother is supplying the fetus with antibodies that will help protect against disease. All organs are developed, with the lungs maturing all the way until the day of delivery. The fetus is about 19 - 21 inches in length and weighs anywhere from 6   lbs to 10 lbs.    Labor and Childbirth: Active Labor  During active labor, your contractions will be stronger and more rhythmic than with early labor. They peak and subside like waves. They may happen 3 to 5 minutes apart and last about 45 to 60 seconds. This part of labor can be hard work. But it is often shorter than early labor. When you reach active labor, exams and tests will be done to see how you and your baby are doing.     Your cervix may dilate 4 to 8 centimeters during the first part of active labor.   Evaluating you and your baby  An exam tells how you and your baby are responding to contractions. Your blood pressure, temperature, and pulse will be checked. A blood or urine sample may also be taken. A fetal monitor will be used to check your baby s heart rate. Sometimes an IV (intravenous) line is started to give you medicine and fluids.  Moving ahead with labor  You may now feel contractions in your whole stomach instead of just the lower part (like during early labor). If your amniotic sac has not broken already, it may break now. Or, it may be broken for you. To help your baby descend, change position often. Walking or sitting in a rocking chair or recliner may help. You may find it hard to relax even though you are tired. You  may also be less interested in talking than you were earlier. If you re having anesthesia, you will get it now.   Special issues during labor  If labor doesn t progress well or a problem arises, you may need a . But your healthcare providers may take certain steps to help you avoid a :    If your cervix isn t dilating, a medicine (oxytocin) may be used to augment labor.    If fetal monitoring shows your baby isn t getting enough oxygen, shifting your body position may help. You may also be given oxygen through a mask.    If you have preeclampsia (a condition that results in high blood pressure, swelling, and other symptoms), you may be given medicines by IV (intravenous). Your healthcare provider may also tell you to lie on your left side.  Responding to contractions  During contractions, try to stay relaxed. Tense muscles use more oxygen, eat up your body s energy, and increase pain. Use the breathing and relaxation techniques you may have learned. And let your support person know how he or she can help. If you ve had problems during a previous birth, focus on the present. Keep in mind that no 2 births are the same.     Support person s note  Here's how you can help:    Have the mother walk or change positions at least once an hour. This improves circulation and helps the baby descend.    Keep reminding the mother to breathe and relax through each contraction.    Reassure her. Try to keep her from getting anxious or overstressed.    Take care of yourself. Take a short break to eat or go to the bathroom when you need to.    Rest when the mother does. You ll both benefit.   Date Last Reviewed: 2015-2017 [employer]. [address] [place] [address], [place] [address]. All rights reserved. This information is not intended as a substitute for professional medical care. Always follow your healthcare professional's instructions.        Stages of Labor    Labor has 3 stages. Your  healthcare provider may talk about the progress of your labor with certain words. One of these is your baby s position. Another is your baby s station. And the effacement and dilation of your cervix will be noted. Read below to learn about these terms and the 3 stages of labor.  Your baby moves into position  Position is your baby s placement in your uterus. Your baby may be facing left or right. He or she may be head first or feet first. Station refers to how far your baby has moved down into your pelvic cavity.  First stage of labor  During the first stage of labor, contractions of the uterus help your cervix thin (efface). They also help it widen (dilate). This will help your baby pass through the birth canal (vagina). At first your contractions will not come that often or last that long. But as time passes, they will come more often, they may be more painful, and they will last longer. They will then be 5 to 30 minutes apart. They will last about 30 to 45 seconds each.  Second stage of labor  In the second stage of labor, you will have stronger contractions of your uterus. They may happen every 3 to 5 minutes. They may last from 45 to 90 seconds each. Your baby will move down the birth canal. Your healthcare provider will ask you to push with each contraction. Try to rest between the contractions if you can. Your baby is delivered at the end of this stage of labor.  Third stage of labor  The third stage of labor comes after your baby is born. This is when the afterbirth (placenta) comes out of your uterus. Your uterus will continue to contract. But the contractions are much milder than before.  Date Last Reviewed: 8/16/2015 2000-2017 [employer]. [address] [place] [address], [place] [address]. All rights reserved. This information is not intended as a substitute for professional medical care. Always follow your healthcare professional's instructions.

## 2018-04-05 ENCOUNTER — ANESTHESIA EVENT (OUTPATIENT)
Dept: OBGYN | Facility: CLINIC | Age: 23
End: 2018-04-05
Payer: MEDICAID

## 2018-04-05 ENCOUNTER — ANESTHESIA (OUTPATIENT)
Dept: OBGYN | Facility: CLINIC | Age: 23
End: 2018-04-05
Payer: MEDICAID

## 2018-04-05 ENCOUNTER — HOSPITAL ENCOUNTER (INPATIENT)
Facility: CLINIC | Age: 23
LOS: 2 days | Discharge: HOME | End: 2018-04-07
Attending: ADVANCED PRACTICE MIDWIFE | Admitting: ADVANCED PRACTICE MIDWIFE
Payer: MEDICAID

## 2018-04-05 LAB
ABO + RH BLD: NORMAL
ABO + RH BLD: NORMAL
HGB BLD-MCNC: 12.2 G/DL (ref 11.7–15.7)
SPECIMEN EXP DATE BLD: NORMAL
T PALLIDUM IGG+IGM SER QL: NEGATIVE

## 2018-04-05 PROCEDURE — 37000011 ZZH ANESTHESIA WARD SERVICE

## 2018-04-05 PROCEDURE — 00HU33Z INSERTION OF INFUSION DEVICE INTO SPINAL CANAL, PERCUTANEOUS APPROACH: ICD-10-PCS | Performed by: ANESTHESIOLOGY

## 2018-04-05 PROCEDURE — 86900 BLOOD TYPING SEROLOGIC ABO: CPT | Performed by: ADVANCED PRACTICE MIDWIFE

## 2018-04-05 PROCEDURE — 25000125 ZZHC RX 250: Performed by: ANESTHESIOLOGY

## 2018-04-05 PROCEDURE — G0463 HOSPITAL OUTPT CLINIC VISIT: HCPCS

## 2018-04-05 PROCEDURE — 25000128 H RX IP 250 OP 636

## 2018-04-05 PROCEDURE — 86780 TREPONEMA PALLIDUM: CPT | Performed by: ADVANCED PRACTICE MIDWIFE

## 2018-04-05 PROCEDURE — 40000671 ZZH STATISTIC ANESTHESIA CASE

## 2018-04-05 PROCEDURE — 0HQ9XZZ REPAIR PERINEUM SKIN, EXTERNAL APPROACH: ICD-10-PCS | Performed by: ADVANCED PRACTICE MIDWIFE

## 2018-04-05 PROCEDURE — 12000029 ZZH R&B OB INTERMEDIATE

## 2018-04-05 PROCEDURE — 85018 HEMOGLOBIN: CPT | Performed by: ADVANCED PRACTICE MIDWIFE

## 2018-04-05 PROCEDURE — 25000125 ZZHC RX 250: Performed by: ADVANCED PRACTICE MIDWIFE

## 2018-04-05 PROCEDURE — 72200001 ZZH LABOR CARE VAGINAL DELIVERY SINGLE

## 2018-04-05 PROCEDURE — 25000128 H RX IP 250 OP 636: Performed by: ADVANCED PRACTICE MIDWIFE

## 2018-04-05 PROCEDURE — 25000132 ZZH RX MED GY IP 250 OP 250 PS 637: Performed by: ADVANCED PRACTICE MIDWIFE

## 2018-04-05 PROCEDURE — 59410 OBSTETRICAL CARE: CPT | Performed by: ADVANCED PRACTICE MIDWIFE

## 2018-04-05 PROCEDURE — 25000125 ZZHC RX 250

## 2018-04-05 PROCEDURE — 3E0R3BZ INTRODUCTION OF ANESTHETIC AGENT INTO SPINAL CANAL, PERCUTANEOUS APPROACH: ICD-10-PCS | Performed by: ANESTHESIOLOGY

## 2018-04-05 PROCEDURE — 27110038 ZZH RX 271

## 2018-04-05 PROCEDURE — 86901 BLOOD TYPING SEROLOGIC RH(D): CPT | Performed by: ADVANCED PRACTICE MIDWIFE

## 2018-04-05 PROCEDURE — 36415 COLL VENOUS BLD VENIPUNCTURE: CPT | Performed by: ADVANCED PRACTICE MIDWIFE

## 2018-04-05 RX ORDER — IBUPROFEN 800 MG/1
800 TABLET, FILM COATED ORAL
Status: DISCONTINUED | OUTPATIENT
Start: 2018-04-05 | End: 2018-04-05

## 2018-04-05 RX ORDER — ACETAMINOPHEN 325 MG/1
650 TABLET ORAL EVERY 4 HOURS PRN
Status: DISCONTINUED | OUTPATIENT
Start: 2018-04-05 | End: 2018-04-05

## 2018-04-05 RX ORDER — AMOXICILLIN 250 MG
1 CAPSULE ORAL 2 TIMES DAILY PRN
Status: DISCONTINUED | OUTPATIENT
Start: 2018-04-05 | End: 2018-04-07 | Stop reason: HOSPADM

## 2018-04-05 RX ORDER — ACETAMINOPHEN 325 MG/1
650 TABLET ORAL EVERY 4 HOURS PRN
Status: DISCONTINUED | OUTPATIENT
Start: 2018-04-05 | End: 2018-04-07 | Stop reason: HOSPADM

## 2018-04-05 RX ORDER — METHYLERGONOVINE MALEATE 0.2 MG/ML
200 INJECTION INTRAVENOUS
Status: DISCONTINUED | OUTPATIENT
Start: 2018-04-05 | End: 2018-04-05

## 2018-04-05 RX ORDER — AMOXICILLIN 250 MG
2 CAPSULE ORAL 2 TIMES DAILY PRN
Status: DISCONTINUED | OUTPATIENT
Start: 2018-04-05 | End: 2018-04-07 | Stop reason: HOSPADM

## 2018-04-05 RX ORDER — NALOXONE HYDROCHLORIDE 0.4 MG/ML
.1-.4 INJECTION, SOLUTION INTRAMUSCULAR; INTRAVENOUS; SUBCUTANEOUS
Status: DISCONTINUED | OUTPATIENT
Start: 2018-04-05 | End: 2018-04-05

## 2018-04-05 RX ORDER — EPHEDRINE SULFATE 50 MG/ML
INJECTION, SOLUTION INTRAMUSCULAR; INTRAVENOUS; SUBCUTANEOUS
Status: DISCONTINUED
Start: 2018-04-05 | End: 2018-04-05 | Stop reason: HOSPADM

## 2018-04-05 RX ORDER — LANOLIN 100 %
OINTMENT (GRAM) TOPICAL
Status: DISCONTINUED | OUTPATIENT
Start: 2018-04-05 | End: 2018-04-07 | Stop reason: HOSPADM

## 2018-04-05 RX ORDER — HYDROCORTISONE 2.5 %
CREAM (GRAM) TOPICAL 3 TIMES DAILY PRN
Status: DISCONTINUED | OUTPATIENT
Start: 2018-04-05 | End: 2018-04-07 | Stop reason: HOSPADM

## 2018-04-05 RX ORDER — MISOPROSTOL 200 UG/1
400 TABLET ORAL
Status: DISCONTINUED | OUTPATIENT
Start: 2018-04-05 | End: 2018-04-07 | Stop reason: HOSPADM

## 2018-04-05 RX ORDER — ONDANSETRON 2 MG/ML
4 INJECTION INTRAMUSCULAR; INTRAVENOUS EVERY 6 HOURS PRN
Status: DISCONTINUED | OUTPATIENT
Start: 2018-04-05 | End: 2018-04-05

## 2018-04-05 RX ORDER — EPHEDRINE SULFATE 50 MG/ML
5 INJECTION, SOLUTION INTRAMUSCULAR; INTRAVENOUS; SUBCUTANEOUS
Status: DISCONTINUED | OUTPATIENT
Start: 2018-04-05 | End: 2018-04-05

## 2018-04-05 RX ORDER — OXYCODONE AND ACETAMINOPHEN 5; 325 MG/1; MG/1
1 TABLET ORAL
Status: DISCONTINUED | OUTPATIENT
Start: 2018-04-05 | End: 2018-04-05

## 2018-04-05 RX ORDER — NALOXONE HYDROCHLORIDE 0.4 MG/ML
.1-.4 INJECTION, SOLUTION INTRAMUSCULAR; INTRAVENOUS; SUBCUTANEOUS
Status: DISCONTINUED | OUTPATIENT
Start: 2018-04-05 | End: 2018-04-07 | Stop reason: HOSPADM

## 2018-04-05 RX ORDER — CARBOPROST TROMETHAMINE 250 UG/ML
250 INJECTION, SOLUTION INTRAMUSCULAR
Status: DISCONTINUED | OUTPATIENT
Start: 2018-04-05 | End: 2018-04-05

## 2018-04-05 RX ORDER — OXYTOCIN/0.9 % SODIUM CHLORIDE 30/500 ML
100 PLASTIC BAG, INJECTION (ML) INTRAVENOUS CONTINUOUS
Status: DISCONTINUED | OUTPATIENT
Start: 2018-04-05 | End: 2018-04-07 | Stop reason: HOSPADM

## 2018-04-05 RX ORDER — BUPIVACAINE HYDROCHLORIDE 2.5 MG/ML
INJECTION, SOLUTION INFILTRATION; PERINEURAL PRN
Status: DISCONTINUED | OUTPATIENT
Start: 2018-04-05 | End: 2018-04-05

## 2018-04-05 RX ORDER — ONDANSETRON 2 MG/ML
4 INJECTION INTRAMUSCULAR; INTRAVENOUS EVERY 6 HOURS PRN
Status: DISCONTINUED | OUTPATIENT
Start: 2018-04-05 | End: 2018-04-07 | Stop reason: HOSPADM

## 2018-04-05 RX ORDER — NALBUPHINE HYDROCHLORIDE 10 MG/ML
2.5-5 INJECTION, SOLUTION INTRAMUSCULAR; INTRAVENOUS; SUBCUTANEOUS EVERY 6 HOURS PRN
Status: DISCONTINUED | OUTPATIENT
Start: 2018-04-05 | End: 2018-04-05

## 2018-04-05 RX ORDER — OXYTOCIN/0.9 % SODIUM CHLORIDE 30/500 ML
340 PLASTIC BAG, INJECTION (ML) INTRAVENOUS CONTINUOUS PRN
Status: DISCONTINUED | OUTPATIENT
Start: 2018-04-05 | End: 2018-04-07 | Stop reason: HOSPADM

## 2018-04-05 RX ORDER — OXYTOCIN/0.9 % SODIUM CHLORIDE 30/500 ML
100-340 PLASTIC BAG, INJECTION (ML) INTRAVENOUS CONTINUOUS PRN
Status: COMPLETED | OUTPATIENT
Start: 2018-04-05 | End: 2018-04-05

## 2018-04-05 RX ORDER — OXYTOCIN 10 [USP'U]/ML
10 INJECTION, SOLUTION INTRAMUSCULAR; INTRAVENOUS
Status: DISCONTINUED | OUTPATIENT
Start: 2018-04-05 | End: 2018-04-05

## 2018-04-05 RX ORDER — SODIUM CHLORIDE, SODIUM LACTATE, POTASSIUM CHLORIDE, CALCIUM CHLORIDE 600; 310; 30; 20 MG/100ML; MG/100ML; MG/100ML; MG/100ML
INJECTION, SOLUTION INTRAVENOUS CONTINUOUS
Status: DISCONTINUED | OUTPATIENT
Start: 2018-04-05 | End: 2018-04-05

## 2018-04-05 RX ORDER — OXYTOCIN 10 [USP'U]/ML
10 INJECTION, SOLUTION INTRAMUSCULAR; INTRAVENOUS
Status: DISCONTINUED | OUTPATIENT
Start: 2018-04-05 | End: 2018-04-07 | Stop reason: HOSPADM

## 2018-04-05 RX ORDER — BISACODYL 10 MG
10 SUPPOSITORY, RECTAL RECTAL DAILY PRN
Status: DISCONTINUED | OUTPATIENT
Start: 2018-04-07 | End: 2018-04-07 | Stop reason: HOSPADM

## 2018-04-05 RX ORDER — IBUPROFEN 800 MG/1
800 TABLET, FILM COATED ORAL EVERY 6 HOURS PRN
Status: DISCONTINUED | OUTPATIENT
Start: 2018-04-05 | End: 2018-04-06

## 2018-04-05 RX ADMIN — ACETAMINOPHEN 650 MG: 325 TABLET, FILM COATED ORAL at 19:02

## 2018-04-05 RX ADMIN — ACETAMINOPHEN 650 MG: 325 TABLET, FILM COATED ORAL at 14:49

## 2018-04-05 RX ADMIN — Medication: at 04:11

## 2018-04-05 RX ADMIN — OXYTOCIN-SODIUM CHLORIDE 0.9% IV SOLN 30 UNIT/500ML 340 ML/HR: 30-0.9/5 SOLUTION at 07:57

## 2018-04-05 RX ADMIN — SODIUM CHLORIDE, POTASSIUM CHLORIDE, SODIUM LACTATE AND CALCIUM CHLORIDE 1000 ML: 600; 310; 30; 20 INJECTION, SOLUTION INTRAVENOUS at 03:58

## 2018-04-05 RX ADMIN — SENNOSIDES AND DOCUSATE SODIUM 2 TABLET: 8.6; 5 TABLET ORAL at 19:02

## 2018-04-05 RX ADMIN — SODIUM CHLORIDE, POTASSIUM CHLORIDE, SODIUM LACTATE AND CALCIUM CHLORIDE: 600; 310; 30; 20 INJECTION, SOLUTION INTRAVENOUS at 07:25

## 2018-04-05 RX ADMIN — ACETAMINOPHEN 650 MG: 325 TABLET, FILM COATED ORAL at 23:14

## 2018-04-05 RX ADMIN — BUPIVACAINE HYDROCHLORIDE 15 ML: 2.5 INJECTION, SOLUTION INFILTRATION; PERINEURAL at 04:09

## 2018-04-05 RX ADMIN — ACETAMINOPHEN 650 MG: 325 TABLET, FILM COATED ORAL at 09:39

## 2018-04-05 NOTE — ANESTHESIA PROCEDURE NOTES
Peripheral nerve/Neuraxial procedure note :        Assessment/Narrative  .  .  . Comments:  Pt seen and interviewed prior to epidural placement for labor analgesia.  This epidural is to be placed in anticipation of normal vaginal delivery.  Risk discussed and consent given prior to performance of procedure.  Time out consisting of identification of patient and desire for epidural analgesia was confirmed.  Sterile prep of lumbar spine was accomplished with povidone iodine three times.  L34? (class 3 obesity) interspace was identified.  Local anesthetic infiltration with 1.5% lido with epi 1/200k was performed with 1.5 cc.  17 G Tuohy needle was advanced in the midline with loss of resistance technique.  No CSF, blood, or paresthesias were noted with placement.  Test dose of 1.5% Lidocaine with epi 1/200k, 3 cc., was injected without evidence of intrathecal or intravascular injection.  Incremental bolus of 0.25% Bupivicaine was injected to a total volume of 15 cc.  Epidural cath 19 G was advanced through needle approx. 6 cm.  No paresthesia was noted with placement and aspiration of cath was negative for blood.  Catheter secured with tegaderm and tape.  Epidural infusion begun after double check of pump settings.  Nursing to inform if there are any complications, but none were noted prior to leaving patient room.  I, or my partners, remain immediately available for management of any issues or complications.  I, or my partners, will monitor at appropriate intervals.  NANCIE Saldana MD

## 2018-04-05 NOTE — PROGRESS NOTES
CNM PROGRESS NOTE    SUBJECTIVE:  Patient comfortable and sleeping with epidural. Being turned side to side.     OBJECTIVE:  /52  Temp 98.1  F (36.7  C) (Oral)  LMP 06/10/2017    Fetal heart tones: Baseline 130's   Variability: moderate  Accelerations: present  Decelerations: absent    Contractions: Pt is tobi every 2 minutes, lasting 60 seconds and palpates strong    Cervix: 7/ 100% / -1, Vtx  ROM: ruptured forebag for blood tinged fluid.     Pitocin- none  Antibiotics- none  Cervical ripening: N/A    ASSESSMENT:  IUP @ 40w3d active labor   GBS- negative     PLAN:   comfort measures prn   Anticipate   Alerted next CNM that patient is here in case she does not deliver by 0800.    SHERON Ni CNM

## 2018-04-05 NOTE — PLAN OF CARE
0726-Baby was having prolonged decel dropping to 60's  0728- pt was repositioned and o2 was placed, requested for more RN at bedside, and page out for midwife.  0729-Midwife present at bedside along with second RN  0730-Another RN present began IV flush  0732-FSE placed by midwife, pt was placed in hands and knees position, then in Trendelenburg while in hands and knees, with FHTs fluctuating from 140's to 180's.  0736-Dr. Gill present  0737- Pt pushing.

## 2018-04-05 NOTE — PLAN OF CARE
Data: Patient presented to Birthplace: 2018  3:26 AM.  Reason for maternal/fetal assessment is uterine contractions, leaking vaginal fluid. Patient reports contractions woke her at 0200 and became stronger and closer.  Patient is a .  Prenatal record reviewed. Pregnancy has been uncomplicated..  Gestational Age 40w3d. VSS. Fetal movement present. Patient denies pelvic pressure, vomiting, headache, visual disturbances, epigastric or URQ pain, significant edema. Support person is present.   Action: Verbal consent for EFM. Triage assessment completed. Bill of rights reviewed.  Response: Patient verbalized agreement with plan.  Jeana Whiteside CNM updated and labor orders received.

## 2018-04-05 NOTE — PLAN OF CARE
Data: Sarthak Barnes transferred to 422 via wheelchair at 1020. Baby transferred via parent's arms.  Action: Receiving unit notified of transfer: Yes. Patient and family notified of room change. Report given to SANJIV Gongora at 1025. Belongings sent to receiving unit. Accompanied by Registered Nurse. Oriented patient to surroundings. Call light within reach. ID bands double-checked with receiving RN.  Response: Patient tolerated transfer and is stable.

## 2018-04-05 NOTE — PLAN OF CARE
Problem: Patient Care Overview  Goal: Plan of Care/Patient Progress Review  Outcome: Improving  Patient stable post transfer to [facility]. Tolerating regular diet. Had tylenol after delivery but wanting to stay away from medications and would like to manage pain with heat and ice if possible. Continue to monitor.

## 2018-04-05 NOTE — L&D DELIVERY NOTE
Delivery Note: Called to patient room by RN. Baby having prolonged decel to 60's. FSC placed, O2 on mother, and positions changed. FHR recovered to 180's, then down to 140's. Maternal pushing began with uterine contractions.  to viable female at 0744. Infant brought to maternal chest. Vigorous, with spontaneous cry. Bulb suctioned and stimulated. Cord clamped and cut by FATHER OF BABY after 1 minute of delayed cord clamping. Apgar's 8 at one minute and 9 at five minutes. Weight 8lbs 2oz. Spontaneous delivery of intact placenta, 3 vessel cord. EBL 300cc. Pitocin bolus running. Fundus firm at U. First degree perineal laceration repaired with 3.0 vicryl under epidural anesthesia. Mother and infant stable and bonding. Jeana Whiteside CNM

## 2018-04-05 NOTE — H&P
Labor Admission History and Physical    Sarthak Barnes is a 23 year old female,  ,   partnered,     Patient's last menstrual period was 06/10/2017., Estimated Date of Delivery: 2018 is calculated from lmp and verified with U/S     Pt is admitted to the Birthplace at [facility] on 2018 at 3:48 AM     in active labor.  Membranes are grossly ruptured since 0330. Patient called reporting increase in contractions at 0200. She reported contractions every two-five minutes, getting stronger. Patient reports that her water broke on the elevator ride up to the Birthplace, clear fluid.     HPI:   Baby active Yes  Labor start time 0200, when did contractions become regular.  Membranes are intact.  Bloody show No   Any changes with medical history since last prenatal visit No  Declines syphilis screening.        PRENATAL COURSE  Prenatal care began at 18 wks gestation for a total of 7 prenatal visits.  Total wt gain 30lbs  Prenatal Blood Pressure: WNL  Prenatal course was essentially uncomplicated.  History of MRSA, had two negative tests in pregnancy.     HISTORY  No Known Allergies  Past Medical History:   Diagnosis Date     Migraine      MRSA (methicillin resistant staph aureus) culture positive     was told at a walk in clinic that she has MRSA and was treated, no reoccurance.      Past Surgical History:   Procedure Laterality Date     D & C  2013     Family History   Problem Relation Age of Onset     Schizophrenia Mother      Social History   Substance Use Topics     Smoking status: Former Smoker     Quit date: 2014     Smokeless tobacco: Never Used     Alcohol use No     Obstetric History       T1      L1     SAB1   TAB0   Ectopic0   Multiple0   Live Births1       # Outcome Date GA Lbr Logan/2nd Weight Sex Delivery Anes PTL Lv   3 Current            2 Term 10/18/14 40w0d   M Vag-Spont   JAVAD   1 SAB      SAB             LABS:       Lab Results   Component Value Date     ABO B  2017       Lab Results   Component Value Date    RH Pos 2017     Rhogam not indicated  No results found for: RUBELLAABIGG   Anti treponema negative   No results found for: HIV  Lab Results   Component Value Date    HGB 11.8 01/15/2018      Lab Results   Component Value Date    HEPBANG Consistent with immunity 2017     Lab Results   Component Value Date    GBS Negative 2018       ROS  Pt denies significant constitutional symptoms including fever and/or malaise.  Pt denies significant respiratory, cardiovacular, GI, or muscular/skeletal complaints.    Neuro: Denies HA and visual changes  Psych: Denies depression and anxiety    PHYSICAL EXAM:  LMP 06/10/2017  General appearance:  healthy, alert and active   Heart: RRR  Lungs: CTA bilaterally, normal respiratory effort  Abdomen: gravid, single vertex fetus, non-tender, EFW 8 lbs.   Legs: reflexes 2+ bilaterally, no clonus, no edema     Contractions: Pt is tobi every 5 minutes, lasting 60 seconds and palpates strong    Fetal heart tones: baseline 150's with moderate variability, accelerationspresent and decelerations absent  Category  1         fetal heart rate tracing    Pelvic Exam: 5/ 90% / Anterior/ soft/ -2  BLOODY SHOW:: no  Membranes as listed above    ASSESSMENT:  IUP @ 40.3 wks gestation  in active labor  NST  reactive  Parity: Multip  GBS negative and membranes ruptured for 30mins  Category  1         fetal heart rate tracing    PLAN:  Admit - see IP orders  Pain medication epidural per patient request.   Anticipate     Jeana Whiteside CNM

## 2018-04-05 NOTE — IP AVS SNAPSHOT
MRN:[id number]                      After Visit Summary   4/5/2018    Sarthak Barnes    MRN: [id number]           Thank you!     Thank you for choosing [facility] for your care. Our goal is always to provide you with excellent care. Hearing back from our patients is one way we can continue to improve our services. Please take a few minutes to complete the written survey that you may receive in the mail after you visit. If you would like to speak to someone directly about your visit please contact Patient Relations at [phone]. Thank you!          Patient Information     Date Of Birth          1995        Designated Caregiver       Most Recent Value    Caregiver    Will someone help with your care after discharge? no      About your hospital stay     You were admitted on:  April 5, 2018 You last received care in the:  [facility]    You were discharged on:  April 7, 2018        Reason for your hospital stay       Maternity care                  Who to Call     For medical emergencies, please call 911.  For non-urgent questions about your medical care, please call your primary care provider or clinic, [phone]          Attending Provider     Provider Specialty    Jeana Whiteside APRN [facility]    Magi Nolasco APRN [facility]       Primary Care Provider Office Phone # Fax #    [facility] [phone]      After Care Instructions     Activity       Review discharge instructions            Diet       Resume previous diet            Discharge Instructions - Postpartum visit       Schedule postpartum visit with your provider and return to clinic in 2 weeks.                  Further instructions from your care team       Postpartum Vaginal Delivery Instructions    Activity       Ask family and friends for help when you need it.    Do not place anything in your vagina for 6 weeks.    You are not restricted on  other activities, but take it easy for a few weeks to allow your body to recover from delivery.  You are able to do any activities you feel up to that point.    No driving until you have stopped taking your pain medications (usually two weeks after delivery).     Call your health care provider if you have any of these symptoms:       Increased pain, swelling, redness, or fluid around your stiches from an episiotomy or perineal tear.    A fever above 100.4 F (38 C) with or without chills when placing a thermometer under your tongue.    You soak a sanitary pad with blood within 1 hour, or you see blood clots larger than a golf ball.    Bleeding that lasts more than 6 weeks.    Vaginal discharge that smells bad.    Severe pain, cramping or tenderness in your lower belly area.    A need to urinate more frequently (use the toilet more often), more urgently (use the toilet very quickly), or it burns when you urinate.    Nausea and vomiting.    Redness, swelling or pain around a vein in your leg.    Problems breastfeeding or a red or painful area on your breast.    Chest pain and cough or are gasping for air.    Problems coping with sadness, anxiety, or depression.  If you have any concerns about hurting yourself or the baby, call your provider immediately.     You have questions or concerns after you return home.     Keep your hands clean:  Always wash your hands before touching your perineal area and stitches.  This helps reduce your risk of infection.  If your hands aren't dirty, you may use an alcohol hand-rub to clean your hands. Keep your nails clean and short.        Pending Results     No orders found from 4/3/2018 to 4/6/2018.            Statement of Approval     Ordered          04/07/18 0854  I have reviewed and agree with all the recommendations and orders detailed in this document.  EFFECTIVE NOW     Approved and electronically signed by:  Graciela Perkins CNM             Admission Information     Date & Time  "Provider Department Dept. Phone    2018 Magi Nolasco APRN CNM [facility] [phone]      Your Vitals Were     Blood Pressure Temperature Respirations Last Period          126/73 97.6  F (36.4  C) (Oral) 16 06/10/2017        [employer] Information     [employer] lets you send messages to your doctor, view your test results, renew your prescriptions, schedule appointments and more. To sign up, go to [email or web address] . Click on \"Log in\" on the left side of the screen, which will take you to the Welcome page. Then click on \"Sign up Now\" on the right side of the page.     You will be asked to enter the access code listed below, as well as some personal information. Please follow the directions to create your username and password.     Your access code is: [email or web address]  Expires: 2018  3:47 PM     Your access code will  in 90 days. If you need help or a new code, please call your [facility] clinic or [phone].        Care EveryWhere ID     This is your Care EveryWhere ID. This could be used by other organizations to access your [facility] medical records  [id number]        Equal Access to Services     SONIA JUÁREZ AH: Hadavis pichardoo Somervin, waaxda luqadaha, qaybta kaalmada adeegyada, kaveh wasserman. So [facility] [phone].    ATENCIÓN: Si habla español, tiene a martins disposición servicios gratuitos de asistencia lingüística. Llame al [phone].    We comply with applicable federal civil rights laws and [place] laws. We do not discriminate on the basis of race, color, national origin, age, disability, sex, sexual orientation, or gender identity.               Review of your medicines      START taking        Dose / Directions    acetaminophen 325 MG tablet   Commonly known as:  TYLENOL        Dose:  650 mg   Take 2 tablets (650 mg) by mouth every 4 hours as needed for mild pain   Quantity:  100 tablet   Refills:  0       naproxen 500 MG tablet "   Commonly known as:  NAPROSYN        Dose:  500 mg   Take 1 tablet (500 mg) by mouth 2 times daily as needed for moderate pain   Quantity:  30 tablet   Refills:  1         CONTINUE these medicines which have NOT CHANGED        Dose / Directions    amoxicillin 500 MG capsule   Commonly known as:  AMOXIL   Used for:  Acute cystitis without hematuria        Dose:  500 mg   Take 1 capsule (500 mg) by mouth 3 times daily   Quantity:  30 capsule   Refills:  0       prenatal multivitamin plus iron 27-0.8 MG Tabs per tablet        Dose:  1 tablet   Take 1 tablet by mouth daily   Refills:  0            Where to get your medicines      Some of these will need a paper prescription and others can be bought over the counter. Ask your nurse if you have questions.     Bring a paper prescription for each of these medications     acetaminophen 325 MG tablet    naproxen 500 MG tablet                Protect others around you: Learn how to safely use, store and throw away your medicines at www.disposemymeds.org.             Medication List: This is a list of all your medications and when to take them. Check marks below indicate your daily home schedule. Keep this list as a reference.      Medications           Morning Afternoon Evening Bedtime As Needed    acetaminophen 325 MG tablet   Commonly known as:  TYLENOL   Take 2 tablets (650 mg) by mouth every 4 hours as needed for mild pain   Last time this was given:  650 mg on 4/7/2018 11:28 AM                                amoxicillin 500 MG capsule   Commonly known as:  AMOXIL   Take 1 capsule (500 mg) by mouth 3 times daily                                naproxen 500 MG tablet   Commonly known as:  NAPROSYN   Take 1 tablet (500 mg) by mouth 2 times daily as needed for moderate pain   Last time this was given:  500 mg on 4/7/2018 10:37 AM                                prenatal multivitamin plus iron 27-0.8 MG Tabs per tablet   Take 1 tablet by mouth daily

## 2018-04-05 NOTE — ANESTHESIA PREPROCEDURE EVALUATION
PAC NOTE:       ANESTHESIA PRE EVALUATION:  Anesthesia Evaluation       history and physical reviewed . Pt has had prior anesthetic. Type: General    No history of anesthetic complications          ROS/MED HX    ENT/Pulmonary:  - neg pulmonary ROS     Neurologic:  - neg neurologic ROS     Cardiovascular:  - neg cardiovascular ROS       METS/Exercise Tolerance:     Hematologic:         Musculoskeletal:         GI/Hepatic:  - neg GI/hepatic ROS       Renal/Genitourinary:         Endo:         Psychiatric:         Infectious Disease:         Malignancy:         Other:                     Physical Exam  Normal systems: cardiovascular, pulmonary and dental    Airway   Mallampati: II  TM distance: > 3 FB  Neck ROM: full  Mouth opening: > 3 cm    Dental     Cardiovascular       Pulmonary     Other findings: Lab Test        01/15/18     08/31/17                       1627                            WBC          8.3           --           HGB          11.8         15.1          MCV          88            --           PLT          154          190            No lab results found.                 Class 3 obesity      Anesthesia Plan      History & Physical Review      ASA Status:  .  OB Epidural Asa: 3            Postoperative Care      Consents  Anesthetic plan, risks, benefits and alternatives discussed with:  Patient..                            .

## 2018-04-05 NOTE — IP AVS SNAPSHOT
[facility] Postpartum    [address]    [facility] [address]    Phone:  [phone]    Fax:  [phone]                                       After Visit Summary   4/5/2018    Sarthak Barnes    MRN: [id number]           After Visit Summary Signature Page     I have received my discharge instructions, and my questions have been answered. I have discussed any challenges I see with this plan with the nurse or doctor.    ..........................................................................................................................................  Patient/Patient Representative Signature      ..........................................................................................................................................  Patient Representative Print Name and Relationship to Patient    ..................................................               ................................................  Date                                            Time    ..........................................................................................................................................  Reviewed by Signature/Title    ...................................................              ..............................................  Date                                                            Time

## 2018-04-06 LAB — HGB BLD-MCNC: 12.4 G/DL (ref 11.7–15.7)

## 2018-04-06 PROCEDURE — 36415 COLL VENOUS BLD VENIPUNCTURE: CPT | Performed by: ADVANCED PRACTICE MIDWIFE

## 2018-04-06 PROCEDURE — 12000027 ZZH R&B OB

## 2018-04-06 PROCEDURE — 25000132 ZZH RX MED GY IP 250 OP 250 PS 637: Performed by: ADVANCED PRACTICE MIDWIFE

## 2018-04-06 PROCEDURE — 85018 HEMOGLOBIN: CPT | Performed by: ADVANCED PRACTICE MIDWIFE

## 2018-04-06 RX ORDER — OXYCODONE HYDROCHLORIDE 5 MG/1
5 TABLET ORAL
Status: DISCONTINUED | OUTPATIENT
Start: 2018-04-06 | End: 2018-04-07 | Stop reason: HOSPADM

## 2018-04-06 RX ORDER — NAPROXEN 500 MG/1
500 TABLET ORAL 2 TIMES DAILY PRN
Status: DISCONTINUED | OUTPATIENT
Start: 2018-04-06 | End: 2018-04-07 | Stop reason: HOSPADM

## 2018-04-06 RX ADMIN — OXYCODONE HYDROCHLORIDE 5 MG: 5 TABLET ORAL at 07:22

## 2018-04-06 RX ADMIN — OXYCODONE HYDROCHLORIDE 5 MG: 5 TABLET ORAL at 04:30

## 2018-04-06 RX ADMIN — NAPROXEN 500 MG: 500 TABLET ORAL at 10:31

## 2018-04-06 RX ADMIN — NAPROXEN 500 MG: 500 TABLET ORAL at 22:50

## 2018-04-06 RX ADMIN — ACETAMINOPHEN 650 MG: 325 TABLET, FILM COATED ORAL at 11:27

## 2018-04-06 RX ADMIN — ACETAMINOPHEN 650 MG: 325 TABLET, FILM COATED ORAL at 07:22

## 2018-04-06 RX ADMIN — ACETAMINOPHEN 650 MG: 325 TABLET, FILM COATED ORAL at 03:16

## 2018-04-06 RX ADMIN — OXYCODONE HYDROCHLORIDE 5 MG: 5 TABLET ORAL at 10:31

## 2018-04-06 RX ADMIN — ACETAMINOPHEN 650 MG: 325 TABLET, FILM COATED ORAL at 15:44

## 2018-04-06 RX ADMIN — ACETAMINOPHEN 650 MG: 325 TABLET, FILM COATED ORAL at 21:09

## 2018-04-06 RX ADMIN — SENNOSIDES AND DOCUSATE SODIUM 2 TABLET: 8.6; 5 TABLET ORAL at 21:18

## 2018-04-06 RX ADMIN — OXYCODONE HYDROCHLORIDE 5 MG: 5 TABLET ORAL at 13:50

## 2018-04-06 RX ADMIN — OXYCODONE HYDROCHLORIDE 5 MG: 5 TABLET ORAL at 21:09

## 2018-04-06 RX ADMIN — OXYCODONE HYDROCHLORIDE 5 MG: 5 TABLET ORAL at 17:39

## 2018-04-06 NOTE — PLAN OF CARE
Patient complaining of severe abdominal cramping.  Using heat with minimal relief.  Patient took tylenol at 2320 and cannot take ibuprofen due to a history of it making her sick.  Patient has been feeling nauseated from the pain.  Patient took a shower with some relief.  Patient states nausea is improved.  Will continue to observe pain and call provider if is not improved.

## 2018-04-06 NOTE — PLAN OF CARE
Problem: Postpartum (Vaginal Delivery) (Adult,Obstetrics,Pediatric)  Goal: Signs and Symptoms of Listed Potential Problems Will be Absent, Minimized or Managed (Postpartum)  Signs and symptoms of listed potential problems will be absent, minimized or managed by discharge/transition of care (reference Postpartum (Vaginal Delivery) (Adult,Obstetrics,Pediatric) CPG).   Outcome: Improving  Stable postpartum mother meeting all expected goals except pain control.  Orders obtained for oxycodone.  Will continue to observe pain control.

## 2018-04-06 NOTE — PLAN OF CARE
Problem: Patient Care Overview  Goal: Plan of Care/Patient Progress Review  Outcome: Improving  Patient stable. Meeting expected goals. Independent with self and  cares.

## 2018-04-06 NOTE — PLAN OF CARE
Problem: Postpartum (Vaginal Delivery) (Adult,Obstetrics,Pediatric)  Goal: Signs and Symptoms of Listed Potential Problems Will be Absent, Minimized or Managed (Postpartum)  Signs and symptoms of listed potential problems will be absent, minimized or managed by discharge/transition of care (reference Postpartum (Vaginal Delivery) (Adult,Obstetrics,Pediatric) CPG).   Outcome: Improving  VSS. Pain well controlled on tylenol and with heating pack and ice. Voiding adequately. Eating and drinking well. Ambulating independently. Showering independently.  Independent in infant cares. Meeting expected outcomes.

## 2018-04-06 NOTE — PLAN OF CARE
At patient's bedside and she is sleeping soundly.  Informed significant other to notify RN when she wakes up.

## 2018-04-06 NOTE — ANESTHESIA POSTPROCEDURE EVALUATION
Patient: Sarthak Barnes    * No procedures listed *    Diagnosis:* No pre-op diagnosis entered *  Diagnosis Additional Information: Labor epidural    Anesthesia Type:  Epidural    Note:  Anesthesia Post Evaluation         Comments:     S/P epidural for labor.   I or my partner was immediately available for management of this patient during epidural analgesia infusion.  VSS.  Doing well. Block resolved.  Neuro at baseline. Denies positional headache. Minimal side effects easily managed w/ PRN meds. No apparent anesthetic complications. No follow-up required.    Matthew Saldana MD        Last vitals:  Vitals:    04/05/18 1453 04/05/18 1700 04/06/18 0316   BP: 123/77 129/74 110/68   Resp: 16 16 16   Temp: 98.1  F (36.7  C) 97.6  F (36.4  C) 97.6  F (36.4  C)         Electronically Signed By: Matthew Saldana MD  April 6, 2018  7:54 AM

## 2018-04-06 NOTE — PROGRESS NOTES
Alma Burden CNM Progress Note: Postpartum Day #1    2018  10:12 AM    SUBJECTIVE:  Patient is stable  and is tolerating acitivity well  Baby is rooming in  Complications since 2 hours post delivery: None  Pain is not well controlled.  Patient is taking pain medications. She has requested oxycodone as she has GI upset with ibuprofen  Breastfeeding status:initiated   Elimination:  She is voiding without difficulty.  She has not had a bowel movement  Desired contraception Unsure  Denies heavy bleeding and passing large clots.  Feels good about birth experience.    OBJECTIVE:  /71  Temp 97.4  F (36.3  C) (Oral)  Resp 16  LMP 06/10/2017    Constitutional: healthy, alert and no distress    Breasts: Currently breastfeeding    Fundus: Uterine fundus is firm, non-tender and at the level of the umbilicus    Perineum: Perineum is intact and/or well approximated, minimal swelling    Lochia: Lochia is appropriate for the duration of time since delivery.     ASSESSMENT:  PPD #1    Doing well.  Hemoglobin   Date Value Ref Range Status   2018 12.4 11.7 - 15.7 g/dL Final   ]      PLAN:  Continue routine care  Reviewed breastfeeding  Reviewed postpartum warning signs  Reviewed postpartum blues and postpartum depression warning signs  Plans unsure for contraception postpartum  Anticipated discharge tomorrow        GARY FINN CNM

## 2018-04-06 NOTE — PROVIDER NOTIFICATION
04/06/18 0333   Provider Notification   Provider Name/Title Magi Nolasco CNM   Method of Notification Phone   Request Evaluate-Remote   Notification Reason Other     Magi Nolasco CNM notified of cramping pain uncontrolled with tylenol alone.  Patient is unable to take Ibuprofen as it makes her sick.  Exam and vitals WNL.  Orders obtained for oxycodone as that worked with last baby.

## 2018-04-06 NOTE — PLAN OF CARE
Problem: Patient Care Overview  Goal: Plan of Care/Patient Progress Review  Outcome: Improving  Patient stable. Up in room. Bottle feeding . Pain controlled with ordered medications. Continue to monitor.

## 2018-04-06 NOTE — PROGRESS NOTES
Pain Eval: Paged by bedside RN, patient having moderate-severe uterine cramping not relieved with tylenol and heat.  Patient does not tolerated ibuprofen. Requesting Oxycodone for relief.   Location of Pain: Uterine   Duration of Pain: Intermittent   Rating of Pain: 7/10 per nursing staff   Pain is better with:   Pain is worse with: activity and breastfeeding   Treatment so far consists of: Tylenol and heat, will add prescription for Oxycodone 5mg Q3 hours   :  SHERON Worthy, PRINCESSM

## 2018-04-07 VITALS — DIASTOLIC BLOOD PRESSURE: 73 MMHG | TEMPERATURE: 97.6 F | SYSTOLIC BLOOD PRESSURE: 126 MMHG | RESPIRATION RATE: 16 BRPM

## 2018-04-07 PROCEDURE — 25000132 ZZH RX MED GY IP 250 OP 250 PS 637: Performed by: ADVANCED PRACTICE MIDWIFE

## 2018-04-07 RX ORDER — NAPROXEN 500 MG/1
500 TABLET ORAL 2 TIMES DAILY PRN
Qty: 30 TABLET | Refills: 1 | Status: SHIPPED | OUTPATIENT
Start: 2018-04-07

## 2018-04-07 RX ORDER — ACETAMINOPHEN 325 MG/1
650 TABLET ORAL EVERY 4 HOURS PRN
Qty: 100 TABLET | Refills: 0 | Status: SHIPPED | OUTPATIENT
Start: 2018-04-07

## 2018-04-07 RX ADMIN — ACETAMINOPHEN 650 MG: 325 TABLET, FILM COATED ORAL at 11:28

## 2018-04-07 RX ADMIN — OXYCODONE HYDROCHLORIDE 5 MG: 5 TABLET ORAL at 01:15

## 2018-04-07 RX ADMIN — OXYCODONE HYDROCHLORIDE 5 MG: 5 TABLET ORAL at 10:36

## 2018-04-07 RX ADMIN — ACETAMINOPHEN 650 MG: 325 TABLET, FILM COATED ORAL at 07:04

## 2018-04-07 RX ADMIN — OXYCODONE HYDROCHLORIDE 5 MG: 5 TABLET ORAL at 07:04

## 2018-04-07 RX ADMIN — NAPROXEN 500 MG: 500 TABLET ORAL at 10:37

## 2018-04-07 RX ADMIN — ACETAMINOPHEN 650 MG: 325 TABLET, FILM COATED ORAL at 01:15

## 2018-04-07 RX ADMIN — SENNOSIDES AND DOCUSATE SODIUM 2 TABLET: 8.6; 5 TABLET ORAL at 10:37

## 2018-04-07 NOTE — PROGRESS NOTES
CNM Postpartum Discharge Note    SIGNIFICANT PROBLEMS:  Patient Active Problem List    Diagnosis Date Noted      (normal spontaneous vaginal delivery) 2018     Priority: Medium     Indication for care in labor or delivery 2018     Priority: Medium     Indication for care in labor and delivery, antepartum 2018     Priority: Medium     Encounter for triage in pregnant patient 2018     Priority: Medium     Supervision of normal pregnancy 2017     Priority: Medium     MRSA (methicillin resistant staph aureus) culture positive      Priority: Medium     was told at a walk in clinic that she has MRSA and was treated, no reoccurance.        Migraine      Priority: Medium         SUBJECTIVE:  Patient is stable  and is tolerating acitivity well  Baby is rooming in  Complications since 2 hours post delivery: None  Pain is well controlled.  Patient is taking pain medications.  Breastfeeding status:initiated and formula fed   Elimination:  She is voiding without difficulty.  She has had a bowel movement  Desired contraception Combination oral contraceptive pill  Denies heavy bleeding and passing large clots.    INTERVAL HISTORY:  /73  Temp 97.6  F (36.4  C) (Oral)  Resp 16  LMP 06/10/2017  Breastfeeding? Unknown    Constitutional: healthy, alert and no distress    Breasts: Currently breastfeeding    Fundus: Uterine fundus is firm, non-tender and below the level of the umbilicus    Perineum: Perineum is intact and/or well approximated, minimal swelling    Lochia: Lochia is appropriate for the duration of time since delivery.     Postpartum hemoglobin   Hemoglobin   Date Value Ref Range Status   2018 12.4 11.7 - 15.7 g/dL Final     Blood type   Lab Results   Component Value Date    ABO B 2018       Lab Results   Component Value Date    RH Pos 2018     Rubella status No results found for: RUBELLAABIGG  History of depression:  no    ASSESSMENT/PLAN:  Normal postpartum  course  Stable Post-partum day #2  Complications:none  Postpartum warning s/s reviewed, including bleeding/clots, fever, mastitis & thromboemboli   Exercise, diet and rest reviewed  PP Sapna/darrin reviewed  Continue prenatal vitamins while breastfeeding  Birthcontrol planned:Combined hormonal method (pills, patch, or ring) and pt does not have a prescription or as d/c meds  Educated on postpartum blues and postpartum depression warnings signs/symptoms  2 week phone call follow-up to be done by delivering CNM  Follow-up in 2 weeks with CNMs at [facility]  Plan d/c home today    Current Discharge Medication List      CONTINUE these medications which have NOT CHANGED    Details   Prenatal Vit-Fe Fumarate-FA (PRENATAL MULTIVITAMIN PLUS IRON) 27-0.8 MG TABS per tablet Take 1 tablet by mouth daily      amoxicillin (AMOXIL) 500 MG capsule Take 1 capsule (500 mg) by mouth 3 times daily  Qty: 30 capsule, Refills: 0    Associated Diagnoses: Acute cystitis without hematuria             GARY FINN CNM

## 2018-04-07 NOTE — PLAN OF CARE
Problem: Patient Care Overview  Goal: Plan of Care/Patient Progress Review  Patient stable. Independent in cares for self and . Pain managed well with Oxycodone and Tylenol. Bottle feeding, bonding well with . FOB is present and supportive in room.

## 2018-04-07 NOTE — PLAN OF CARE
Problem: Patient Care Overview  Goal: Plan of Care/Patient Progress Review  Outcome: Adequate for Discharge Date Met: 04/07/18  Patient stable. Wanting pain medications as she can have them. Up ambulating, tolerating regular diet. Meeting expected goals for discharge. Discharge instructions completed. Patient states she understands all discharge instructions and all her questions have been answered. Verbalizes when she needs to return to the clinic for follow up for herself and baby. She is caring for herself and baby independently. Discharge to home with baby.

## 2018-04-07 NOTE — PROGRESS NOTES
[facility]    Discharge Summary  Obstetrics    Date of Admission:  2018  Date of Discharge:  2018  Discharging Provider: GARY FINN  Date of Service (when I saw the patient): 18    Discharge Diagnoses   Normal Spontaneous Vaginal Delivery    History of Present Illness   Sarthak Barnes is a 23 year old female who presented with painful contractions    Hospital Course   The patient's hospital course was unremarkable.  She recovered as anticipated and experienced no post-delivery complications.  On discharge, her pain was well controlled. Vaginal bleeding is similar to peak menstrual flow.  Voiding without difficulty.  Ambulating well and tolerating a normal diet.  No fevers.  Bottlefeeding well.  Infant is stable.  She was discharged on post-partum day #2.    Post-partum hemoglobin:   Hemoglobin   Date Value Ref Range Status   2018 12.4 11.7 - 15.7 g/dL Final       GARY FINN    Discharge Disposition   Discharged to home   Condition at discharge: Good    Primary Care Physician   [facility]    Consultations This Hospital Stay   ANESTHESIOLOGY IP CONSULT  HOME CARE POST PARTUM/ IP CONSULT  LACTATION IP CONSULT    Discharge Orders   No discharge procedures on file.  Discharge Medications   Current Discharge Medication List      CONTINUE these medications which have NOT CHANGED    Details   Prenatal Vit-Fe Fumarate-FA (PRENATAL MULTIVITAMIN PLUS IRON) 27-0.8 MG TABS per tablet Take 1 tablet by mouth daily      amoxicillin (AMOXIL) 500 MG capsule Take 1 capsule (500 mg) by mouth 3 times daily  Qty: 30 capsule, Refills: 0    Associated Diagnoses: Acute cystitis without hematuria           Allergies   No Known Allergies         SHERON Robledo, CNSHANE

## 2018-05-17 ENCOUNTER — PRENATAL OFFICE VISIT (OUTPATIENT)
Dept: OBGYN | Facility: CLINIC | Age: 23
End: 2018-05-17
Payer: MEDICAID

## 2018-05-17 VITALS — DIASTOLIC BLOOD PRESSURE: 64 MMHG | BODY MASS INDEX: 39.14 KG/M2 | SYSTOLIC BLOOD PRESSURE: 106 MMHG | WEIGHT: 228 LBS

## 2018-05-17 DIAGNOSIS — Z30.9 CONTRACEPTIVE MANAGEMENT: ICD-10-CM

## 2018-05-17 LAB — HCG, QUAL URINE: NORMAL

## 2018-05-17 PROCEDURE — 99207 ZZC POST PARTUM EXAM: CPT | Performed by: ADVANCED PRACTICE MIDWIFE

## 2018-05-17 PROCEDURE — 96372 THER/PROPH/DIAG INJ SC/IM: CPT | Performed by: ADVANCED PRACTICE MIDWIFE

## 2018-05-17 PROCEDURE — 84703 CHORIONIC GONADOTROPIN ASSAY: CPT | Performed by: ADVANCED PRACTICE MIDWIFE

## 2018-05-17 RX ORDER — MEDROXYPROGESTERONE ACETATE 150 MG/ML
150 INJECTION, SUSPENSION INTRAMUSCULAR
Qty: 3 ML | Refills: 3 | OUTPATIENT
Start: 2018-05-17

## 2018-05-17 NOTE — PROGRESS NOTES
Midwife Postpartum 6 Week Visit    Sarthak Barnes is a 23 year old here for a postpartum checkup.     Delivery date was 18. She had a  of a viable girl, weight 8 pounds 2 oz., with no complications  Since delivery, she has not been breast feeding. She has not had other complications.     She is voiding and having bowel movements without difficulty.      Contraception was discussed and patient desires depoprovera.   She  has had intercourse since delivery, protected. UPT in clinic is negative.   She complains of No  perineal discomfort.     Mood is Stable  Patient screened for postpartum depression.   Depression Rating was:   Last PHQ-9 score on record = No flowsheet data found.  Last GAD7 score on record = No flowsheet data found.  Alcohol Score = 0    ROS:  CONSTITUTIONAL: NEGATIVE for fever, chills, change in weight  INTEGUMENTARU/SKIN: NEGATIVE for worrisome rashes, moles or lesions  EYES: NEGATIVE for vision changes or irritation  ENT: NEGATIVE for ear, mouth and throat problems  RESP: NEGATIVE for significant cough or SOB  BREAST: NEGATIVE for masses, tenderness or discharge  CV: NEGATIVE for chest pain, palpitations or peripheral edema  GI: NEGATIVE for nausea, abdominal pain, heartburn, or change in bowel habits  : NEGATIVE for unusual urinary or vaginal symptoms. Periods are regular.  MUSCULOSKELETAL: NEGATIVE for significant arthralgias or myalgia  NEURO: NEGATIVE for weakness, dizziness or paresthesias  PSYCHIATRIC: NEGATIVE for changes in mood or affect      Current Outpatient Prescriptions:      acetaminophen (TYLENOL) 325 MG tablet, Take 2 tablets (650 mg) by mouth every 4 hours as needed for mild pain, Disp: 100 tablet, Rfl: 0     naproxen (NAPROSYN) 500 MG tablet, Take 1 tablet (500 mg) by mouth 2 times daily as needed for moderate pain, Disp: 30 tablet, Rfl: 1.   Obstetric History       T2      L2     SAB1   TAB0   Ectopic0   Multiple0   Live Births2       # Outcome Date GA  Lbr Logan/2nd Weight Sex Delivery Anes PTL Lv   3 Term 18 40w3d 05:11 / 00:33 8 lb 1.7 oz (3.676 kg) F Vag-Spont EPI  JAVAD      Name: LUCINDA JONES      Complications: Fetal Intolerance      Apgar1:  8                Apgar5: 9   2 Term 10/18/14 40w0d   M Vag-Spont   JAVAD   1 SAB      SAB           Last pap:    Lab Results   Component Value Date    PAP NIL 2017     Hgb in hospital was 12.4    EXAM:  /64 (BP Location: Left arm, Patient Position: Sitting, Cuff Size: Adult Regular)  Wt 228 lb (103.4 kg)  LMP 06/10/2017  Breastfeeding? No  BMI 39.14 kg/m2  BMI: Body mass index is 39.14 kg/(m^2).  Constitutional: healthy, alert and no distress  Neck: symmetrical, thyroid normal size, no masses present, no lymphadenopathy present.   Breast:normal without masses, tenderness or nipple discharge and no palpable axillary masses or adenopathy, deferred, patient lactating.  Abdomen: soft, non-tender, diastasis 1 FB's    PELVIC EXAM:  Vulva: No lesions, well healed, BUS WNL, no tenderness  Vagina: Moist, pink, discharge normal  well rugated, no lesions  Cervix:smooth, pink, no visible lesions  Uterus: Involuted to normal size, non-tender, no masses palpated  Ovaries: No masses palpated  Pelvic tone: good  Rectal exam: deferred      ASSESSMENT:   Normal postpartum exam after .  No diagnosis found.      PLAN:  Results for orders placed or performed during the hospital encounter of 18   Anti Treponema   Result Value Ref Range    Treponema pallidum Antibody Negative NEG^Negative   Hemoglobin   Result Value Ref Range    Hemoglobin 12.2 11.7 - 15.7 g/dL   Hemoglobin   Result Value Ref Range    Hemoglobin 12.4 11.7 - 15.7 g/dL   ABO and Rh   Result Value Ref Range    ABO B     RH(D) Pos     Specimen Expires 2018        Return as needed or at time of next expected pap, pelvic, or breast exam.  Teaching: exercise, birth control and weight/diet  Family Planning:depoprovera  Encourage Sapna and  abdominal exercise.  Continue a multivitamin/prenatal supplement, especially if breastfeeding.  Pap smear was not obtained today.  Postpartum Hgb was not done today.    GDM:  Fasting and 2hr GCT needed:  No  If yes, remind need for annual fasting BG and nutrition/exercise recommendations.    Educated on use/MOA/SE/risk of depo provera.     Jeana Whiteside CNM

## 2018-05-17 NOTE — MR AVS SNAPSHOT
"              After Visit Summary   2018    Sarthak Barnes    MRN: [id number]           Patient Information     Date Of Birth          1995        Visit Information        Provider Department      2018 10:30 AM Jeana Whiteside APRN CNM [facility]        Today's Diagnoses     Postpartum care and examination    -  1       Follow-ups after your visit        Follow-up notes from your care team     Return in about 3 months (around 2018) for depo shot.      Who to contact     If you have questions or need follow up information about today's clinic visit or your schedule please contact [facility] directly at [phone].  Normal or non-critical lab and imaging results will be communicated to you by MyChart, letter or phone within 4 business days after the clinic has received the results. If you do not hear from us within 7 days, please contact the clinic through MyChart or phone. If you have a critical or abnormal lab result, we will notify you by phone as soon as possible.  Submit refill requests through [employer] or call your pharmacy and they will forward the refill request to us. Please allow 3 business days for your refill to be completed.          Additional Information About Your Visit        MyChart Information     [employer] lets you send messages to your doctor, view your test results, renew your prescriptions, schedule appointments and more. To sign up, go to [email or web address] . Click on \"Log in\" on the left side of the screen, which will take you to the Welcome page. Then click on \"Sign up Now\" on the right side of the page.     You will be asked to enter the access code listed below, as well as some personal information. Please follow the directions to create your username and password.     Your access code is: [email or web address]  Expires: 2018  3:47 PM     Your access code will  in 90 days. If you need help or a new code, please call " your [place] clinic or [phone].        Care EveryWhere ID     This is your Care EveryWhere ID. This could be used by other organizations to access your [place] medical records  [id number]        Your Vitals Were     Last Period Breastfeeding? BMI (Body Mass Index)             06/10/2017 No 39.14 kg/m2          Blood Pressure from Last 3 Encounters:   05/17/18 106/64   04/07/18 126/73   04/04/18 112/74    Weight from Last 3 Encounters:   05/17/18 228 lb (103.4 kg)   04/04/18 241 lb 9.6 oz (109.6 kg)   03/06/18 244 lb (110.7 kg)              We Performed the Following     HCL HCG, URINE, NURSE BACKOFFICE          Today's Medication Changes          These changes are accurate as of 5/17/18 11:14 AM.  If you have any questions, ask your nurse or doctor.               Start taking these medicines.        Dose/Directions    medroxyPROGESTERone 150 MG/ML injection   Commonly known as:  DEPO-PROVERA   Used for:  Postpartum care and examination   Started by:  Jeana Whiteside APRN CNSHANE        Dose:  150 mg   Inject 1 mL (150 mg) into the muscle every 3 months   Quantity:  3 mL   Refills:  3            Where to get your medicines      Some of these will need a paper prescription and others can be bought over the counter.  Ask your nurse if you have questions.     You don't need a prescription for these medications     medroxyPROGESTERone 150 MG/ML injection                Primary Care Provider Office Phone # Fax #    [facility] [phone]       [address] [place] [address]        Equal Access to Services     SONIA JUÁREZ : Hadii palmira pichardoo Somervin, waaxda luqadaha, qaybta kaalmada kaveh garcia idinavdeep wasserman. So [facility] [phone].    ATENCIÓN: Si habla español, tiene a martins disposición servicios gratuitos de asistencia lingüística. Llame al [phone].    We comply with applicable federal civil rights laws and [place] laws. We do  not discriminate on the basis of race, color, national origin, age, disability, sex, sexual orientation, or gender identity.            Thank you!     Thank you for choosing [facility]  for your care. Our goal is always to provide you with excellent care. Hearing back from our patients is one way we can continue to improve our services. Please take a few minutes to complete the written survey that you may receive in the mail after your visit with us. Thank you!             Your Updated Medication List - Protect others around you: Learn how to safely use, store and throw away your medicines at www.disposemymeds.org.          This list is accurate as of 18 11:14 AM.  Always use your most recent med list.                   Brand Name Dispense Instructions for use Diagnosis    acetaminophen 325 MG tablet    TYLENOL    100 tablet    Take 2 tablets (650 mg) by mouth every 4 hours as needed for mild pain     (normal spontaneous vaginal delivery)       medroxyPROGESTERone 150 MG/ML injection    DEPO-PROVERA    3 mL    Inject 1 mL (150 mg) into the muscle every 3 months    Postpartum care and examination       naproxen 500 MG tablet    NAPROSYN    30 tablet    Take 1 tablet (500 mg) by mouth 2 times daily as needed for moderate pain     (normal spontaneous vaginal delivery)

## 2018-05-17 NOTE — NURSING NOTE
"Chief Complaint   Patient presents with     Post Partum Exam     6 week post partum-  18 8# 2oz girl       Initial /64 (BP Location: Left arm, Patient Position: Sitting, Cuff Size: Adult Regular)  Wt 228 lb (103.4 kg)  LMP 06/10/2017  Breastfeeding? No  BMI 39.14 kg/m2 Estimated body mass index is 39.14 kg/(m^2) as calculated from the following:    Height as of 18: 5' 4\" (1.626 m).    Weight as of this encounter: 228 lb (103.4 kg).  BP completed using cuff size: regular        The following HM Due: NONE    patient has appointment for today.  Espinoza Perkins CMA                 "

## 2018-05-18 ASSESSMENT — PATIENT HEALTH QUESTIONNAIRE - PHQ9: SUM OF ALL RESPONSES TO PHQ QUESTIONS 1-9: 2

## 2023-06-29 NOTE — MR AVS SNAPSHOT
"              After Visit Summary   3/16/2018    Sarthak Barnes    MRN: [id number]           Patient Information     Date Of Birth          1995        Visit Information        Provider Department      3/16/2018 2:00 PM Mani Jordan MD [facility] Maternal Fetal Medicine [facility]        Today's Diagnoses     Suspected fetal anomaly not found    -  1       Follow-ups after your visit        Your next 10 appointments already scheduled     Mar 22, 2018  3:15 PM CDT   ESTABLISHED PRENATAL with SHERON Harrington CNM   [facility] ([facility]    [address]  [facility] [address]   [phone]              Future tests that were ordered for you today     Open Future Orders        Priority Expected Expires Ordered    [facility] Comprehensive Single Routine  1/16/2019 3/16/2018            Who to contact     If you have questions or need follow up information about today's clinic visit or your schedule please contact [facility] FETAL [facility]IC[facility] directly at [phone].  Normal or non-critical lab and imaging results will be communicated to you by Capicalhart, letter or phone within 4 business days after the clinic has received the results. If you do not hear from us within 7 days, please contact the clinic through [facility] or phone. If you have a critical or abnormal lab result, we will notify you by phone as soon as possible.  Submit refill requests through [employer] or call your pharmacy and they will forward the refill request to us. Please allow 3 business days for your refill to be completed.          Additional Information About Your Visit        Capicalharplay140 Information     SoPost lets you send messages to your doctor, view your test results, renew your prescriptions, schedule appointments and more. To sign up, go to [email or web address] . Click on \"Log in\" on the left side of the screen, which will take you to the Welcome page. Then click on \"Sign " Patient : Torres Cunha Age: 25 year old Sex: male   MRN: [id number] Encounter Date: 6/29/2023    History     Chief Complaint   Patient presents with   • Abdominal Pain       HPI    Torres Cunha is a 25 year old presenting to the emergency department ***    {Chart Review (Optional):136672}    No Known Allergies    No current facility-administered medications for this encounter.     Current Outpatient Medications   Medication Sig   • oxyCODONE/APAP (PERCOCET) 5-325 MG per tablet Take 1-2 tablets by mouth every 6 hours as needed for Pain.   • ondansetron (ZOFRAN ODT) 8 MG disintegrating tablet Take 1 tablet by mouth every 8 hours as needed for Nausea.       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    No family history on file.    Social History     Tobacco Use   • Smoking status: Never   • Smokeless tobacco: Never   Substance Use Topics   • Alcohol use: No     Alcohol/week: 0.0 standard drinks of alcohol   • Drug use: No       Review of Systems     Review of Systems    Physical Exam     ED Triage Vitals [06/29/23 1610]   ED Triage Vitals Group      Temp 97.6 °F (36.4 °C)      Heart Rate 80      Resp 16      /74      SpO2 100 %      EtCO2 mmHg       Height 5' 5\" (1.651 m)      Weight 119 lb 0.8 oz (54 kg)      Weight Scale Used       BMI (Calculated) 19.81      IBW/kg (Calculated) 61.5       Physical Exam      Procedures     Procedures    Lab Results     No results found for this visit on 06/29/23.    EKG     {EKG Documentation (Optional):139980}    Radiology Results     Imaging Results    None         ED Medications     Medications - No data to display      ED Course     Vitals:    06/29/23 1610   BP: 108/74   Pulse: 80   Resp: 16    Temp: 97.6 °F (36.4 °C)   SpO2: 100%   Weight: 54 kg (119 lb 0.8 oz)   Height: 5' 5\" (1.651 m)            {Data Independently Reviewed:931575}    {ED Scoring Tool (Optional):386918}    Consults                {Consult Documentation (Optional):558497}    MDM                 {(TIP) Please include a comprehensive MDM explaining the patients risks and why or why not they needed to be admitted.  This message will delete upon signing.  (Optional):3}    {(Tip) For patients with social needs use dot phrase ermedsocialdoh in your MDM and dot phrase ermeddcsocialdoh in the discharge instruction box:3}       {MDM:893118}        {Does the Patient have sepsis:232423}     Critical Care       {Critical Care:528320}        Disposition     {ED Disposition:220817}          There is no disposition no dispo time  There is no comment    {(TIP) 2023 Billing Criteria (this will vanish upon signing)    Level 4   Must meet requirements of at least   1 of the 3 Categories    Level 5  Must meet requirements of at least   2 of the 3 Categories      Category 1  Test Documents or independent historians  (need 3) -Review of prior external notes  -Review of results of a unique test  -Ordering of Each unique test  -Assessment Requiring an independent historian     Category 2  Independent interpretation of a test preformed by another physician/other qualified health care professional  (Need 1) -Cardiac Monitor Review  -Radiology interpretation  -EKG (unless billed separately)   Category 3  Discussion of Management or test interpretation with external physician/other qualified health care professional/appropriate source  (Need 1)       -Consultation that specifies who you discussed the patient management with and what the plan is.   (Optional):3}       "up Now\" on the right side of the page.     You will be asked to enter the access code listed below, as well as some personal information. Please follow the directions to create your username and password.     Your access code is: [email or web address]  Expires: 5/10/2018  1:14 PM     Your access code will  in 90 days. If you need help or a new code, please call your [facility] or [phone].        Care EveryWhere ID     This is your Care EveryWhere ID. This could be used by other organizations to access your [facility] medical records  [id number]        Your Vitals Were     Last Period                   06/10/2017            Blood Pressure from Last 3 Encounters:   18 120/60   18 126/60   18 125/80    Weight from Last 3 Encounters:   18 110.7 kg (244 lb)   18 109.8 kg (242 lb)   01/15/18 104 kg (229 lb 4.8 oz)              Today, you had the following     No orders found for display       Primary Care Provider Office Phone # Fax #    [facility] [place] [facility] [phone]       [address] [place] [address]        Equal Access to Services     SONIA JUÁREZ : Hadii palmira villegas hadasho Sodeyviali, waaxda luqadaha, qaybta kaalmada adeegyada, kaveh wasserman. So [facility] [phone].    ATENCIÓN: Si habla español, tiene a martins disposición servicios gratuitos de asistencia lingüística. Llame al [phone].    We comply with applicable federal civil rights laws and [place] laws. We do not discriminate on the basis of race, color, national origin, age, disability, sex, sexual orientation, or gender identity.            Thank you!     Thank you for choosing [facility]  for your care. Our goal is always to provide you with excellent care. Hearing back from our patients is one way we can continue to improve our services. Please take a few minutes to complete the written survey that you may receive in the mail after " your visit with us. Thank you!             Your Updated Medication List - Protect others around you: Learn how to safely use, store and throw away your medicines at www.disposemymeds.org.          This list is accurate as of 3/16/18  2:22 PM.  Always use your most recent med list.                   Brand Name Dispense Instructions for use Diagnosis    prenatal multivitamin plus iron 27-0.8 MG Tabs per tablet      Take 1 tablet by mouth daily